# Patient Record
Sex: FEMALE | Race: WHITE | Employment: FULL TIME | URBAN - METROPOLITAN AREA
[De-identification: names, ages, dates, MRNs, and addresses within clinical notes are randomized per-mention and may not be internally consistent; named-entity substitution may affect disease eponyms.]

---

## 2017-03-12 ENCOUNTER — HOSPITAL ENCOUNTER (OUTPATIENT)
Facility: CLINIC | Age: 42
Setting detail: OBSERVATION
Discharge: HOME | End: 2017-03-13
Attending: EMERGENCY MEDICINE | Admitting: INTERNAL MEDICINE
Payer: COMMERCIAL

## 2017-03-12 ENCOUNTER — APPOINTMENT (OUTPATIENT)
Dept: CT IMAGING | Facility: CLINIC | Age: 42
End: 2017-03-12
Attending: EMERGENCY MEDICINE
Payer: COMMERCIAL

## 2017-03-12 DIAGNOSIS — N23 RENAL COLIC: ICD-10-CM

## 2017-03-12 DIAGNOSIS — E87.6 HYPOKALEMIA: ICD-10-CM

## 2017-03-12 LAB
ALBUMIN SERPL-MCNC: 3.1 G/DL (ref 3.4–5)
ALBUMIN UR-MCNC: 30 MG/DL
ALBUMIN UR-MCNC: NEGATIVE MG/DL
ALP SERPL-CCNC: 73 U/L (ref 40–150)
ALT SERPL W P-5'-P-CCNC: 19 U/L (ref 0–50)
ANION GAP SERPL CALCULATED.3IONS-SCNC: 9 MMOL/L (ref 3–14)
APPEARANCE UR: CLEAR
APPEARANCE UR: CLEAR
AST SERPL W P-5'-P-CCNC: 15 U/L (ref 0–45)
BACTERIA #/AREA URNS HPF: ABNORMAL /HPF
BACTERIA #/AREA URNS HPF: ABNORMAL /HPF
BASOPHILS # BLD AUTO: 0 10E9/L (ref 0–0.2)
BASOPHILS NFR BLD AUTO: 0.2 %
BILIRUB SERPL-MCNC: 1 MG/DL (ref 0.2–1.3)
BILIRUB UR QL STRIP: NEGATIVE
BILIRUB UR QL STRIP: NEGATIVE
BUN SERPL-MCNC: 10 MG/DL (ref 7–30)
CALCIUM SERPL-MCNC: 8.7 MG/DL (ref 8.5–10.1)
CAOX CRY #/AREA URNS HPF: ABNORMAL /HPF
CHLORIDE SERPL-SCNC: 107 MMOL/L (ref 94–109)
CO2 SERPL-SCNC: 26 MMOL/L (ref 20–32)
COLOR UR AUTO: ABNORMAL
COLOR UR AUTO: ABNORMAL
CREAT SERPL-MCNC: 0.85 MG/DL (ref 0.52–1.04)
DIFFERENTIAL METHOD BLD: ABNORMAL
EOSINOPHIL # BLD AUTO: 0.1 10E9/L (ref 0–0.7)
EOSINOPHIL NFR BLD AUTO: 0.3 %
ERYTHROCYTE [DISTWIDTH] IN BLOOD BY AUTOMATED COUNT: 13.2 % (ref 10–15)
GFR SERPL CREATININE-BSD FRML MDRD: 73 ML/MIN/1.7M2
GLUCOSE SERPL-MCNC: 96 MG/DL (ref 70–99)
GLUCOSE UR STRIP-MCNC: NEGATIVE MG/DL
GLUCOSE UR STRIP-MCNC: NEGATIVE MG/DL
HCG SERPL QL: NEGATIVE
HCT VFR BLD AUTO: 38.1 % (ref 35–47)
HGB BLD-MCNC: 13.6 G/DL (ref 11.7–15.7)
HGB UR QL STRIP: ABNORMAL
HGB UR QL STRIP: ABNORMAL
IMM GRANULOCYTES # BLD: 0.1 10E9/L (ref 0–0.4)
IMM GRANULOCYTES NFR BLD: 0.3 %
KETONES UR STRIP-MCNC: 5 MG/DL
KETONES UR STRIP-MCNC: 5 MG/DL
LACTATE BLD-SCNC: 1.1 MMOL/L (ref 0.7–2.1)
LEUKOCYTE ESTERASE UR QL STRIP: NEGATIVE
LEUKOCYTE ESTERASE UR QL STRIP: NEGATIVE
LIPASE SERPL-CCNC: 66 U/L (ref 73–393)
LYMPHOCYTES # BLD AUTO: 1.9 10E9/L (ref 0.8–5.3)
LYMPHOCYTES NFR BLD AUTO: 12.5 %
MCH RBC QN AUTO: 31.6 PG (ref 26.5–33)
MCHC RBC AUTO-ENTMCNC: 35.7 G/DL (ref 31.5–36.5)
MCV RBC AUTO: 88 FL (ref 78–100)
MONOCYTES # BLD AUTO: 1 10E9/L (ref 0–1.3)
MONOCYTES NFR BLD AUTO: 6.4 %
MUCOUS THREADS #/AREA URNS LPF: PRESENT /LPF
MUCOUS THREADS #/AREA URNS LPF: PRESENT /LPF
NEUTROPHILS # BLD AUTO: 12.5 10E9/L (ref 1.6–8.3)
NEUTROPHILS NFR BLD AUTO: 80.3 %
NITRATE UR QL: POSITIVE
NITRATE UR QL: POSITIVE
NRBC # BLD AUTO: 0 10*3/UL
NRBC BLD AUTO-RTO: 0 /100
PH UR STRIP: 6 PH (ref 5–7)
PH UR STRIP: 6 PH (ref 5–7)
PLATELET # BLD AUTO: 260 10E9/L (ref 150–450)
POTASSIUM SERPL-SCNC: 3.1 MMOL/L (ref 3.4–5.3)
PROT SERPL-MCNC: 6.4 G/DL (ref 6.8–8.8)
RBC # BLD AUTO: 4.31 10E12/L (ref 3.8–5.2)
RBC #/AREA URNS AUTO: 17 /HPF (ref 0–2)
RBC #/AREA URNS AUTO: 66 /HPF (ref 0–2)
SODIUM SERPL-SCNC: 142 MMOL/L (ref 133–144)
SP GR UR STRIP: 1.02 (ref 1–1.03)
SP GR UR STRIP: 1.03 (ref 1–1.03)
SQUAMOUS #/AREA URNS AUTO: 1 /HPF (ref 0–1)
SQUAMOUS #/AREA URNS AUTO: 5 /HPF (ref 0–1)
URN SPEC COLLECT METH UR: ABNORMAL
URN SPEC COLLECT METH UR: ABNORMAL
UROBILINOGEN UR STRIP-MCNC: 4 MG/DL (ref 0–2)
UROBILINOGEN UR STRIP-MCNC: 4 MG/DL (ref 0–2)
WBC # BLD AUTO: 15.5 10E9/L (ref 4–11)
WBC #/AREA URNS AUTO: 2 /HPF (ref 0–2)
WBC #/AREA URNS AUTO: 7 /HPF (ref 0–2)

## 2017-03-12 PROCEDURE — 74176 CT ABD & PELVIS W/O CONTRAST: CPT

## 2017-03-12 PROCEDURE — G0378 HOSPITAL OBSERVATION PER HR: HCPCS

## 2017-03-12 PROCEDURE — 84703 CHORIONIC GONADOTROPIN ASSAY: CPT | Performed by: EMERGENCY MEDICINE

## 2017-03-12 PROCEDURE — 25000132 ZZH RX MED GY IP 250 OP 250 PS 637: Performed by: EMERGENCY MEDICINE

## 2017-03-12 PROCEDURE — 99285 EMERGENCY DEPT VISIT HI MDM: CPT | Mod: 25

## 2017-03-12 PROCEDURE — 83605 ASSAY OF LACTIC ACID: CPT | Performed by: EMERGENCY MEDICINE

## 2017-03-12 PROCEDURE — 25000125 ZZHC RX 250: Performed by: INTERNAL MEDICINE

## 2017-03-12 PROCEDURE — 96365 THER/PROPH/DIAG IV INF INIT: CPT

## 2017-03-12 PROCEDURE — 96361 HYDRATE IV INFUSION ADD-ON: CPT

## 2017-03-12 PROCEDURE — 85025 COMPLETE CBC W/AUTO DIFF WBC: CPT | Performed by: EMERGENCY MEDICINE

## 2017-03-12 PROCEDURE — 25000128 H RX IP 250 OP 636

## 2017-03-12 PROCEDURE — 96375 TX/PRO/DX INJ NEW DRUG ADDON: CPT

## 2017-03-12 PROCEDURE — 25000128 H RX IP 250 OP 636: Performed by: EMERGENCY MEDICINE

## 2017-03-12 PROCEDURE — 99220 ZZC INITIAL OBSERVATION CARE,LEVL III: CPT | Performed by: INTERNAL MEDICINE

## 2017-03-12 PROCEDURE — 96376 TX/PRO/DX INJ SAME DRUG ADON: CPT

## 2017-03-12 PROCEDURE — 81001 URINALYSIS AUTO W/SCOPE: CPT | Performed by: EMERGENCY MEDICINE

## 2017-03-12 PROCEDURE — 80053 COMPREHEN METABOLIC PANEL: CPT | Performed by: EMERGENCY MEDICINE

## 2017-03-12 PROCEDURE — 83690 ASSAY OF LIPASE: CPT | Performed by: EMERGENCY MEDICINE

## 2017-03-12 PROCEDURE — 87086 URINE CULTURE/COLONY COUNT: CPT | Performed by: EMERGENCY MEDICINE

## 2017-03-12 PROCEDURE — 25000128 H RX IP 250 OP 636: Performed by: INTERNAL MEDICINE

## 2017-03-12 RX ORDER — ONDANSETRON 4 MG/1
4 TABLET, ORALLY DISINTEGRATING ORAL EVERY 8 HOURS PRN
Qty: 10 TABLET | Refills: 0 | Status: SHIPPED | OUTPATIENT
Start: 2017-03-12 | End: 2017-03-15

## 2017-03-12 RX ORDER — POTASSIUM CHLORIDE 29.8 MG/ML
20 INJECTION INTRAVENOUS
Status: DISCONTINUED | OUTPATIENT
Start: 2017-03-12 | End: 2017-03-12

## 2017-03-12 RX ORDER — NALOXONE HYDROCHLORIDE 0.4 MG/ML
.1-.4 INJECTION, SOLUTION INTRAMUSCULAR; INTRAVENOUS; SUBCUTANEOUS
Status: DISCONTINUED | OUTPATIENT
Start: 2017-03-12 | End: 2017-03-13 | Stop reason: HOSPADM

## 2017-03-12 RX ORDER — PROCHLORPERAZINE MALEATE 5 MG
5-10 TABLET ORAL EVERY 6 HOURS PRN
Status: DISCONTINUED | OUTPATIENT
Start: 2017-03-12 | End: 2017-03-13 | Stop reason: HOSPADM

## 2017-03-12 RX ORDER — TAMSULOSIN HYDROCHLORIDE 0.4 MG/1
0.4 CAPSULE ORAL DAILY
Status: DISCONTINUED | OUTPATIENT
Start: 2017-03-13 | End: 2017-03-13 | Stop reason: HOSPADM

## 2017-03-12 RX ORDER — ACETAMINOPHEN 325 MG/1
650 TABLET ORAL EVERY 4 HOURS PRN
Status: DISCONTINUED | OUTPATIENT
Start: 2017-03-12 | End: 2017-03-13 | Stop reason: HOSPADM

## 2017-03-12 RX ORDER — ONDANSETRON 2 MG/ML
4 INJECTION INTRAMUSCULAR; INTRAVENOUS ONCE
Status: COMPLETED | OUTPATIENT
Start: 2017-03-12 | End: 2017-03-12

## 2017-03-12 RX ORDER — KETOROLAC TROMETHAMINE 30 MG/ML
30 INJECTION, SOLUTION INTRAMUSCULAR; INTRAVENOUS ONCE
Status: COMPLETED | OUTPATIENT
Start: 2017-03-12 | End: 2017-03-12

## 2017-03-12 RX ORDER — AMOXICILLIN 250 MG
1-2 CAPSULE ORAL 2 TIMES DAILY PRN
Status: DISCONTINUED | OUTPATIENT
Start: 2017-03-12 | End: 2017-03-13 | Stop reason: HOSPADM

## 2017-03-12 RX ORDER — ONDANSETRON 4 MG/1
4 TABLET, ORALLY DISINTEGRATING ORAL EVERY 6 HOURS PRN
Status: DISCONTINUED | OUTPATIENT
Start: 2017-03-12 | End: 2017-03-13 | Stop reason: HOSPADM

## 2017-03-12 RX ORDER — POTASSIUM CHLORIDE 7.45 MG/ML
10 INJECTION INTRAVENOUS
Status: DISCONTINUED | OUTPATIENT
Start: 2017-03-12 | End: 2017-03-13

## 2017-03-12 RX ORDER — POTASSIUM CHLORIDE 1.5 G/1.58G
20-40 POWDER, FOR SOLUTION ORAL
Status: DISCONTINUED | OUTPATIENT
Start: 2017-03-12 | End: 2017-03-13

## 2017-03-12 RX ORDER — HYDROMORPHONE HYDROCHLORIDE 1 MG/ML
0.5 INJECTION, SOLUTION INTRAMUSCULAR; INTRAVENOUS; SUBCUTANEOUS ONCE
Status: COMPLETED | OUTPATIENT
Start: 2017-03-12 | End: 2017-03-12

## 2017-03-12 RX ORDER — SODIUM CHLORIDE AND POTASSIUM CHLORIDE 150; 900 MG/100ML; MG/100ML
INJECTION, SOLUTION INTRAVENOUS CONTINUOUS
Status: DISCONTINUED | OUTPATIENT
Start: 2017-03-12 | End: 2017-03-13 | Stop reason: HOSPADM

## 2017-03-12 RX ORDER — SODIUM CHLORIDE 9 MG/ML
1000 INJECTION, SOLUTION INTRAVENOUS CONTINUOUS
Status: DISCONTINUED | OUTPATIENT
Start: 2017-03-12 | End: 2017-03-12

## 2017-03-12 RX ORDER — POTASSIUM CHLORIDE 1500 MG/1
40 TABLET, EXTENDED RELEASE ORAL ONCE
Status: COMPLETED | OUTPATIENT
Start: 2017-03-12 | End: 2017-03-12

## 2017-03-12 RX ORDER — OXYCODONE AND ACETAMINOPHEN 5; 325 MG/1; MG/1
1-2 TABLET ORAL EVERY 4 HOURS PRN
Qty: 15 TABLET | Refills: 0 | Status: SHIPPED | OUTPATIENT
Start: 2017-03-12

## 2017-03-12 RX ORDER — POTASSIUM CHLORIDE 1500 MG/1
20-40 TABLET, EXTENDED RELEASE ORAL
Status: DISCONTINUED | OUTPATIENT
Start: 2017-03-12 | End: 2017-03-13

## 2017-03-12 RX ORDER — HYDROMORPHONE HYDROCHLORIDE 1 MG/ML
0.5 INJECTION, SOLUTION INTRAMUSCULAR; INTRAVENOUS; SUBCUTANEOUS
Status: DISCONTINUED | OUTPATIENT
Start: 2017-03-12 | End: 2017-03-12

## 2017-03-12 RX ORDER — TAMSULOSIN HYDROCHLORIDE 0.4 MG/1
0.4 CAPSULE ORAL DAILY
Qty: 7 CAPSULE | Refills: 1 | Status: SHIPPED | OUTPATIENT
Start: 2017-03-12

## 2017-03-12 RX ORDER — ONDANSETRON 2 MG/ML
4 INJECTION INTRAMUSCULAR; INTRAVENOUS EVERY 6 HOURS PRN
Status: DISCONTINUED | OUTPATIENT
Start: 2017-03-12 | End: 2017-03-13 | Stop reason: HOSPADM

## 2017-03-12 RX ORDER — CEFTRIAXONE 1 G/1
1 INJECTION, POWDER, FOR SOLUTION INTRAMUSCULAR; INTRAVENOUS ONCE
Status: COMPLETED | OUTPATIENT
Start: 2017-03-12 | End: 2017-03-12

## 2017-03-12 RX ORDER — MAGNESIUM SULFATE HEPTAHYDRATE 40 MG/ML
4 INJECTION, SOLUTION INTRAVENOUS EVERY 4 HOURS PRN
Status: DISCONTINUED | OUTPATIENT
Start: 2017-03-12 | End: 2017-03-13

## 2017-03-12 RX ORDER — HYDROMORPHONE HYDROCHLORIDE 1 MG/ML
.3-.5 INJECTION, SOLUTION INTRAMUSCULAR; INTRAVENOUS; SUBCUTANEOUS
Status: DISCONTINUED | OUTPATIENT
Start: 2017-03-12 | End: 2017-03-13 | Stop reason: HOSPADM

## 2017-03-12 RX ORDER — PROCHLORPERAZINE 25 MG
25 SUPPOSITORY, RECTAL RECTAL EVERY 12 HOURS PRN
Status: DISCONTINUED | OUTPATIENT
Start: 2017-03-12 | End: 2017-03-13 | Stop reason: HOSPADM

## 2017-03-12 RX ORDER — OXYCODONE HYDROCHLORIDE 5 MG/1
5-10 TABLET ORAL
Status: DISCONTINUED | OUTPATIENT
Start: 2017-03-12 | End: 2017-03-13 | Stop reason: HOSPADM

## 2017-03-12 RX ADMIN — SODIUM CHLORIDE 1000 ML: 9 INJECTION, SOLUTION INTRAVENOUS at 17:18

## 2017-03-12 RX ADMIN — SODIUM CHLORIDE 1000 ML: 9 INJECTION, SOLUTION INTRAVENOUS at 14:49

## 2017-03-12 RX ADMIN — HYDROMORPHONE HYDROCHLORIDE 1 MG: 1 INJECTION, SOLUTION INTRAMUSCULAR; INTRAVENOUS; SUBCUTANEOUS at 16:01

## 2017-03-12 RX ADMIN — POTASSIUM CHLORIDE 10 MEQ: 14.9 INJECTION, SOLUTION, CONCENTRATE PARENTERAL at 22:38

## 2017-03-12 RX ADMIN — ONDANSETRON 4 MG: 2 INJECTION INTRAMUSCULAR; INTRAVENOUS at 14:49

## 2017-03-12 RX ADMIN — HYDROMORPHONE HYDROCHLORIDE 0.5 MG: 10 INJECTION, SOLUTION INTRAMUSCULAR; INTRAVENOUS; SUBCUTANEOUS at 20:27

## 2017-03-12 RX ADMIN — HYDROMORPHONE HYDROCHLORIDE 0.5 MG: 10 INJECTION, SOLUTION INTRAMUSCULAR; INTRAVENOUS; SUBCUTANEOUS at 18:11

## 2017-03-12 RX ADMIN — HYDROMORPHONE HYDROCHLORIDE 1 MG: 1 INJECTION, SOLUTION INTRAMUSCULAR; INTRAVENOUS; SUBCUTANEOUS at 19:37

## 2017-03-12 RX ADMIN — KETOROLAC TROMETHAMINE 30 MG: 30 INJECTION, SOLUTION INTRAMUSCULAR at 15:33

## 2017-03-12 RX ADMIN — POTASSIUM CHLORIDE AND SODIUM CHLORIDE: 900; 150 INJECTION, SOLUTION INTRAVENOUS at 21:19

## 2017-03-12 RX ADMIN — POTASSIUM CHLORIDE 10 MEQ: 14.9 INJECTION, SOLUTION, CONCENTRATE PARENTERAL at 21:19

## 2017-03-12 RX ADMIN — HYDROMORPHONE HYDROCHLORIDE 0.5 MG: 10 INJECTION, SOLUTION INTRAMUSCULAR; INTRAVENOUS; SUBCUTANEOUS at 22:38

## 2017-03-12 RX ADMIN — ONDANSETRON 4 MG: 2 INJECTION INTRAMUSCULAR; INTRAVENOUS at 20:27

## 2017-03-12 RX ADMIN — HYDROMORPHONE HYDROCHLORIDE 1 MG: 1 INJECTION, SOLUTION INTRAMUSCULAR; INTRAVENOUS; SUBCUTANEOUS at 19:39

## 2017-03-12 RX ADMIN — CEFTRIAXONE 1 G: 1 INJECTION, POWDER, FOR SOLUTION INTRAMUSCULAR; INTRAVENOUS at 17:18

## 2017-03-12 RX ADMIN — POTASSIUM CHLORIDE 40 MEQ: 1500 TABLET, EXTENDED RELEASE ORAL at 18:11

## 2017-03-12 RX ADMIN — PROCHLORPERAZINE EDISYLATE 10 MG: 5 INJECTION INTRAMUSCULAR; INTRAVENOUS at 20:51

## 2017-03-12 ASSESSMENT — ENCOUNTER SYMPTOMS
VOMITING: 0
BACK PAIN: 0
DIARRHEA: 0
FEVER: 0
HEMATURIA: 1
CONSTIPATION: 0
FREQUENCY: 1
BLOOD IN STOOL: 0
ABDOMINAL PAIN: 1
NAUSEA: 1
FLANK PAIN: 1
DYSURIA: 0

## 2017-03-12 ASSESSMENT — PAIN DESCRIPTION - DESCRIPTORS
DESCRIPTORS: SHARP

## 2017-03-12 NOTE — ED PROVIDER NOTES
"  History     Chief Complaint:  Abdominal Pain    HPI   Eugenie Toro is a 41 year old female who presents to the emergency department today for evaluation of abdominal pain. The patient reports that she has a history of kidney stones and kidney infections dating back to November of 2016 and underwent lithotripsy in January. She states that on Tuesday 3/7/2017 she noticed that her urine was pink in color and noticed she started having left sided abdominal/flank pain consistent with past kidney stones. She notes that after returning via car today she was having \"unbearable pain,\" especially with bumps during the car ride, prompting her visit. She also notes some nausea and urgency/frequency as well, but no dysuria. Furthermore, she denies any fevers, emesis, changes in bowel habits, trauma to her back or other concerns at this time. She follows with Dr. Nelson of urology through [facility]. Moreover, the patient notes that Tylenol and Ibuprofen dre doing nothing to take the edge off her pain.     Allergies:  Ciprofloxacin - Hives  Morphine Sulfate - Hives  Sulfa Drugs - Hives    Medications:    Imitrex    Past Medical History:    Anxiety  Chronic kidney disease  Chronic pain  Irritable bowel  Migraines PTSD    Past Surgical History:    Hysterectomy  Tonsillectomy & Adenoidectomy  Cl aff surgical pathology  Appendectomy  Cholecystectomy  Laparoscopy  Removal of kidney stone  Discectomy lumbar posterior microscopic one level (2013)  Orthopedic surgery    Family History:    History reviewed. No pertinent family history.     Social History:  The patient was accompanied to the ED by her  and father.  Smoking Status: Current Every Day Smoker  Alcohol Use: Negative  Marital Status:   [2]    Review of Systems   Constitutional: Negative for fever.   Gastrointestinal: Positive for abdominal pain (Left side radiating towards flank) and nausea. Negative for blood in stool, constipation, diarrhea and vomiting. " "  Genitourinary: Positive for flank pain (Left), frequency, hematuria and urgency. Negative for dysuria.   Musculoskeletal: Negative for back pain.   All other systems reviewed and are negative.    Physical Exam   Vitals:  Patient Vitals for the past 24 hrs:   BP Temp Temp src Pulse Resp SpO2 Height Weight   03/12/17 2009 (!) 173/101 98.2  F (36.8  C) Oral 87 20 94 % 1.651 m (5' 5\") 127 kg (280 lb)   03/12/17 1945 (!) 155/98 - - - - 96 % - -   03/12/17 1936 (!) 168/102 - - 82 20 98 % - -   03/12/17 1900 130/76 - - - - 95 % - -   03/12/17 1845 (!) 158/96 - - - - 96 % - -   03/12/17 1830 130/76 - - - - 97 % - -   03/12/17 1815 (!) 129/101 - - - - 95 % - -   03/12/17 1800 150/88 - - - - 93 % - -   03/12/17 1745 143/83 - - - - 92 % - -   03/12/17 1730 138/90 - - - - 93 % - -   03/12/17 1723 134/87 - - - - 94 % - -   03/12/17 1630 142/86 - - - - 97 % - -   03/12/17 1615 (!) 144/92 - - - - 96 % - -   03/12/17 1600 133/84 - - - - 97 % - -   03/12/17 1545 137/87 - - - - 99 % - -   03/12/17 1530 (!) 155/99 - - - - 99 % - -   03/12/17 1445 (!) 158/94 - - - - 97 % - -   03/12/17 1437 (!) 155/97 - - - - 98 % - -   03/12/17 1407 (!) 176/100 - - - - - - -   03/12/17 1406 - 97.9  F (36.6  C) Oral 88 18 95 % 1.651 m (5' 5\") 79.4 kg (175 lb)     Physical Exam  General: Patient laying on gurney, appears somewhat uncomfortable.   HEENT:   The scalp and head appear normal    The pupils are equal, round, and reactive to light    Extraocular muscles are intact.    The nose is normal.    The oropharynx is normal.      Uvula is in the midline.  There is no peritonsillar abscess.  Neck:  Normal range of motion.    Lungs:  Clear.      No rales, no wheezing.      There is no tachypnea.  Non-labored.  Cardiac: Regular rate.      Normal S1 and S2.      No S3 or S4.      No pathological murmur.      No pericardial rub.  Abdomen: Soft. No distension. No tympani. No rebound. Left lower quadrant tenderness.  Lymph: No anterior or posterior cervical " lymphadenopathy noted.  MS:  Diffuse paralumbar tenderness along left flank.    Normal tone.      Normal movement of all extremities.    Neuro:  Normal mentation.  No focal motor or sensory changes.      Speech normal.  Psych:  Awake.     Alert.      Normal affect.      Appropriate interactions.  Skin:  No rash.      No lesions.    Emergency Department Course     Imaging:  Radiology findings were communicated with the patient who voiced understanding of the findings.    Abdomen/Pelvis CT w/o contrast:  1. Distal left ureter stone is 0.4 cm. This is in the region of the  ureterovesical junction. Mild to moderate left hydronephrosis.  2. No other acute abnormality.  Reading per radiology    Laboratory:  Laboratory findings were communicated with the patient who voiced understanding of the findings.    UA: Ketones: 5 (A), Blood: Moderate (A), urobilinogen: 4.0 (H), Nitrite: Positive (A), WBC/HPF: 7 (H), bacteria: Few (A), Mucous Present (A), RBC: 66 (H), Squamous cells: 5 (H), Calcium Oxalate: Few (A)    UA cathed specimen: Ketones: 5 (A), Blood: Moderate (A), urobilinogen: 4.0 (H), Nitrite: Positive (A), RBC/HPF: 17 (H), bacteria: Few (A), Mucous Present (A)    CBC: WBC: 15.5 (H) o/w WNL. (HGB 13.6, )   CMP: K: 3.1 (L), Albumin: 3.1 (L), Protein total: 6.4 (L) (Creatinine 0.85)  Lactic acid: 1.1  Lipase: 66 (L)  HCG Qualitative Urine: Negative    Urine Culture Aerobic Bacteria: Pending    Interventions:  1449 ns 1000 mL IV  1449 Zofran 4 mg IV  1533 Toradol 30 mg IV  1601 Dilaudid 1 mg IV  1718 ns 1000 mL IV  1718 Rocephin 1 g IV  1811 Dilaudid 0.5 mg IV  1811 Potassium Chloride 40 mEq PO     Emergency Department Course:  Nursing notes and vitals reviewed.  I performed an exam of the patient as documented above.   The patient provided a urine sample here in the emergency department. This was sent for laboratory testing, findings above.  The patient was sent for an Abdomen/Pelvis CT w/o contrast while in the  emergency department, results above.   IV was inserted and blood was drawn for laboratory testing, results above.  At 1716 the patient was rechecked and was updated on the results of her laboratory and imaging studies.   1830: I spoke with Dr. Schrader of Urology regarding patient's presentation, findings, and plan of care.  I discussed the treatment plan with the patient. They expressed understanding of this plan and consented to placement in the observation unit. I discussed the patient with Dr. Arnett, who will admit the patient to a monitored bed for further evaluation and treatment.  I personally reviewed the laboratory and imaging results with the patient and answered all related questions prior to placement in the observation unit.    Impression & Plan      Medical Decision Making:  Eugenie Toro is a 41 year old female who presents to the emergency department today for evaluation of flank pain. The has a 4 mm calculus in her left UVJ with moderate hydronephrosis, but also has urine suggesting a possible infection with nitrites, a few bacteria, and 2 white cells. She received a gram of Rocephin here and I spoke with the urologist on-call for her [facility] urologist. He felt that the patient should also receive antibiotics and then retrieval of the stone tomorrow or if she spikes a fever a retrieval and a stent. The patient did not want to be transferred in the [facility] to [facility] to where her urologist goes. She has also seen Dr. Dick a urologist here in [place]. She agreed for observation here ultimately and I discussed the case with Dr. Schrader. He felt it was reasonable to continue the antibiotics and again if she spikes a fever they would retrieve the stone and place a stent. She will remain NPO after midnight. She has continued to have pain, but did improve with Dilaudid. She has had no more vomiting here. She does have an elevated white count which is also concerning for possible infection,  but she does not have a fever here and has not felt febrile at home, although did not check for one.     Diagnosis:    ICD-10-CM    1. Renal colic N23    2. Hypokalemia E87.6    3. Elevated nitrite in urine suggestive of infection.       Plan: Admit to the observation unit. Antibiotics, IV fluids, pain control, antiemetics if needed, and further cares as discussed.       Scribe Disclosure:  I, Mike Miles, am serving as a scribe at 2:16 PM on 3/12/2017 to document services personally performed by Heriberto Fan MD, based on my observations and the provider's statements to me.    3/12/2017   [facility] EMERGENCY DEPARTMENT         Heriberto Fan MD  03/12/17 2807

## 2017-03-12 NOTE — IP AVS SNAPSHOT
[facility] PreOP/PostOP    [address]    [facility] [address]    Phone:  [phone]    Fax:  [phone]                                       After Visit Summary   3/12/2017    Eugenie Toro    MRN: [id number]           After Visit Summary Signature Page     I have received my discharge instructions, and my questions have been answered. I have discussed any challenges I see with this plan with the nurse or doctor.    ..........................................................................................................................................  Patient/Patient Representative Signature      ..........................................................................................................................................  Patient Representative Print Name and Relationship to Patient    ..................................................               ................................................  Date                                            Time    ..........................................................................................................................................  Reviewed by Signature/Title    ...................................................              ..............................................  Date                                                            Time

## 2017-03-12 NOTE — IP AVS SNAPSHOT
MRN:[id number]                      After Visit Summary   3/12/2017    Eugenie Toro    MRN: [id number]           Thank you!     Thank you for choosing [facility] for your care. Our goal is always to provide you with excellent care. Hearing back from our patients is one way we can continue to improve our services. Please take a few minutes to complete the written survey that you may receive in the mail after you visit. If you would like to speak to someone directly about your visit please contact Patient Relations at [phone]. Thank you!          Patient Information     Date Of Birth          1975        About your hospital stay     You were admitted on:  March 12, 2017 You last received care in the:  [facility] PreOP/PostOP    You were discharged on:  March 13, 2017        Reason for your hospital stay       You were admitted due to concerns for left flank pain due to a kidney stone found on a CT scan. You were treated supportively overnight with IV fluids, antinausea medications. Flomax, and pain control. Urology was consulted and recommended having a procedure performed to assist with you passing the stone. You tolerated the procedure well and are safe to discharge from recovery.                  Who to Call     For medical emergencies, please call [phone].  For non-urgent questions about your medical care, please call your primary care provider or clinic, [phone]  For questions related to your surgery, please call your surgery clinic        Attending Provider     Provider Specialty    Heriberto Fan MD Emergency Medicine    Lucian Arnett MD Internal Medicine       Primary Care Provider Office Phone # Fax #    Nalini [facility] [phone]       [address] [place] [address]        After Care Instructions     Activity       Your activity upon discharge: activity as tolerated and per Urology's  recommendations            Diet       Follow this diet upon discharge: Advance diet as tolerated            Diet Instructions       Resume pre procedure diet            Discharge Instructions       Patient to arrange for follow up appointment in 1 week for stent removal            Encourage fluids       Encourage fluids at home to keep urine clear to light pink                  Follow-up Appointments     Follow-up and recommended labs and tests        Follow up with primary care provider, Nalini Madrid, within 7 days for hospital follow- up.  No follow up labs or test are needed.  Follow up with Urology per their recommendations.                  Your next 10 appointments already scheduled     Mar 21, 2017  2:00 PM CDT   (Arrive by 1:45 PM)   Cystoscopy with Greg Luque MD, [facility] ([facility])    [address]  [address]  [facility] [id number] [phone]              Further instructions from your care team       STENT INFORMATION/DISCHARGE INSTRUCTIONS  [facility]  [facility]  [phone]    During surgery, a stent may be placed in the ureter.  The ureter is the tube that drains urine from the kidney to the bladder.  The stent is placed to dilate (open) the ureter so stone fragments can pass easily through the ureter or to decrease ureteral swelling after surgery or to relieve an obstruction.      The stent is made of silicone.  The upper end of the stent curls in the kidney while the lower end rests in the bladder.    While the stent is in place you may experience the following symptoms:  Blood and/or small blood clots in the urine  Bladder spasms (frequency and urgency of urination)  Discomfort or aching in the back or side where the stent is  Burning or discomfort at the end of urine stream    To decrease these symptoms you should:  Take antispasmodic medication as prescribed  (Detrol, Ditropan, etc.)  Drink plenty of fluids but avoid caffeine and citrus (include cranberry)  If you are having discomfort in back or side, decrease activity    Please call your physician or the physician on call if you experience:  Fever greater than 101 degrees  Severe pain not relieved by pain medication or rest    Please make an appointment for the removal of the stent according to your physician's instructions.      CYSTOSCOPY DISCHARGE INSTRUCTIONS  [facility]  [facility]  [phone]    YOU MAY GO BACK TO YOUR NORMAL DIET AND ACTIVITY, UNLESS YOUR DOCTOR TELLS YOU NOT TO.    FOR THE NEXT TWO DAYS, YOU MAY NOTICE:    SOME BLOOD IN YOUR URINE.  SOME BURNING WHEN YOU URINATE (USE THE TOILET).  AN URGE TO URINATE MORE OFTEN.  BLADDER SPASMS.    THESE ARE NORMAL AFTER THE PROCEDURE.  THEY SHOULD GO AWAY AFTER A DAY OR TWO.  TO RELIEVE THESE PROBLEMS:     DRINK 6 TO 8 LARGE GLASSES OF WATER EACH DAY (INCLUDES DRINKS AT MEALS).  THIS WILL HELP CLEAR THE URINE.    TAKE WARM BATHS TO RELIEVE PAIN AND BLADDER SPASMS.  DO NOT ADD ANYTHING TO THE BATH WATER.    YOUR DOCTOR MAY PRESCRIBE PAIN MEDICINE.  YOU MAY ALSO TAKE TYLENOL (ACETAMINOPHEN) FOR PAIN.    CALL YOUR SURGEON IF YOU HAVE:    A FEVER OVER 101 DEGREES.  CHECK YOUR TEMPERATURE UNDER YOUR TONGUE.    CHILLS.    FAILURE TO URINATE (NO URINE COMES OUT WHEN YOU TRY TO USE THE TOILET).  TRY SOAKING IN A BATHTUB FULL OF WARM WATER.  IF STILL NO URINE, CALL YOUR DOCTOR.    A LOT OF BLOOD IN THE URINE, OR BLOOD CLOTS LARGER THAN A NICKEL.      PAIN IN THE BACK OR BELLY AREA (ABDOMEN).    PAIN OR SPASMS THAT ARE NOT RELIEVED BY WARM TUB BATHS AND PAIN MEDICINE.      SEVERE PAIN, BURNING OR OTHER PROBLEMS WHILE PASSING URINE.    PAIN THAT GETS WORSE AFTER TWO DAYS.        GENERAL ANESTHESIA OR SEDATION ADULT DISCHARGE INSTRUCTIONS   SPECIAL PRECAUTIONS FOR 24 HOURS AFTER SURGERY    IT IS NOT UNUSUAL TO FEEL LIGHT-HEADED OR  FAINT, UP TO 24 HOURS AFTER SURGERY OR WHILE TAKING PAIN MEDICATION.  IF YOU HAVE THESE SYMPTOMS; SIT FOR A FEW MINUTES BEFORE STANDING AND HAVE SOMEONE ASSIST YOU WHEN YOU GET UP TO WALK OR USE THE BATHROOM.    YOU SHOULD REST AND RELAX FOR THE NEXT 24 HOURS AND YOU MUST MAKE ARRANGEMENTS TO HAVE SOMEONE STAY WITH YOU FOR AT LEAST 24 HOURS AFTER YOUR DISCHARGE.  AVOID HAZARDOUS AND STRENUOUS ACTIVITIES.  DO NOT MAKE IMPORTANT DECISIONS FOR 24 HOURS.    DO NOT DRIVE ANY VEHICLE OR OPERATE MECHANICAL EQUIPMENT FOR 24 HOURS FOLLOWING THE END OF YOUR SURGERY.  EVEN THOUGH YOU MAY FEEL NORMAL, YOUR REACTIONS MAY BE AFFECTED BY THE MEDICATION YOU HAVE RECEIVED.    DO NOT DRINK ALCOHOLIC BEVERAGES FOR 24 HOURS FOLLOWING YOUR SURGERY.    DRINK CLEAR LIQUIDS (APPLE JUICE, GINGER ALE, 7-UP, BROTH, ETC.).  PROGRESS TO YOUR REGULAR DIET AS YOU FEEL ABLE.    YOU MAY HAVE A DRY MOUTH, A SORE THROAT, MUSCLES ACHES OR TROUBLE SLEEPING.  THESE SHOULD GO AWAY AFTER 24 HOURS.    CALL YOUR DOCTOR FOR ANY OF THE FOLLOWING:  SIGNS OF INFECTION (FEVER, GROWING TENDERNESS AT THE SURGERY SITE, A LARGE AMOUNT OF DRAINAGE OR BLEEDING, SEVERE PAIN, FOUL-SMELLING DRAINAGE, REDNESS OR SWELLING.    IT HAS BEEN OVER 8 TO 10 HOURS SINCE SURGERY AND YOU ARE STILL NOT ABLE TO URINATE (PASS WATER).         Pending Results     Date and Time Order Name Status Description    3/13/2017 1847 Stone analysis In process     3/12/2017 1631 Urine Culture Aerobic Bacterial Preliminary             Statement of Approval     Ordered          03/13/17 1033  I have reviewed and agree with all the recommendations and orders detailed in this document.  EFFECTIVE NOW     Approved and electronically signed by:  Nelida Cleary PA-C             Admission Information     Date & Time Provider Department Dept. Phone    3/12/2017 Lucian Arnett MD [facility] PreOP/PostOP [phone]      Your Vitals Were     Blood Pressure Pulse Temperature Respirations  "Height Weight    129/100 75 97.6  F (36.4  C) (Temporal) 16 1.651 m (5' 5\") 127 kg (280 lb)    Pulse Oximetry BMI (Body Mass Index)                98% 46.59 kg/m2          Between Information     Between lets you send messages to your doctor, view your test results, renew your prescriptions, schedule appointments and more. To sign up, go to [email or web address] . Click on \"Log in\" on the left side of the screen, which will take you to the Welcome page. Then click on \"Sign up Now\" on the right side of the page.     You will be asked to enter the access code listed below, as well as some personal information. Please follow the directions to create your username and password.     Your access code is: [email or web address]  Expires: 6/10/2017  5:59 PM     Your access code will  in 90 days. If you need help or a new code, please call your [place] clinic or [phone].        Care EveryWhere ID     This is your Care EveryWhere ID. This could be used by other organizations to access your [place] medical records  [id number]           Review of your medicines      START taking        Dose / Directions    HYDROcodone-acetaminophen 5-325 MG per tablet   Commonly known as:  NORCO        Dose:  1-2 tablet   Take 1-2 tablets by mouth every 4 hours as needed for moderate to severe pain (Moderate to Severe Pain)   Quantity:  5 tablet   Refills:  0       ondansetron 4 MG ODT tab   Commonly known as:  ZOFRAN ODT        Dose:  4 mg   Take 1 tablet (4 mg) by mouth every 8 hours as needed for nausea   Quantity:  10 tablet   Refills:  0       oxyCODONE-acetaminophen 5-325 MG per tablet   Commonly known as:  PERCOCET        Dose:  1-2 tablet   Take 1-2 tablets by mouth every 4 hours as needed for moderate to severe pain   Quantity:  15 tablet   Refills:  0       * tamsulosin 0.4 MG capsule   Commonly known as:  FLOMAX        Dose:  0.4 mg   Take 1 capsule (0.4 mg) by mouth daily   Quantity:  7 capsule   Refills:  1       * " tamsulosin 0.4 MG capsule   Commonly known as:  FLOMAX        Dose:  0.4 mg   Take 1 capsule (0.4 mg) by mouth daily   Quantity:  8 capsule   Refills:  1       * Notice:  This list has 2 medication(s) that are the same as other medications prescribed for you. Read the directions carefully, and ask your doctor or other care provider to review them with you.      CONTINUE these medicines which have NOT CHANGED        Dose / Directions    SUMAtriptan 6 MG/0.5ML injection   Commonly known as:  IMITREX        Dose:  6 mg   Inject 6 mg Subcutaneous every 2 hours as needed (migraine.?) Max dose is 12mg/24hrs   Refills:  0            Where to get your medicines      These medications were sent to [place], [place] - [address] [facility]  [address] [facility] [address]     Phone:  [phone]     tamsulosin 0.4 MG capsule         Some of these will need a paper prescription and others can be bought over the counter. Ask your nurse if you have questions.     Bring a paper prescription for each of these medications     HYDROcodone-acetaminophen 5-325 MG per tablet    ondansetron 4 MG ODT tab    oxyCODONE-acetaminophen 5-325 MG per tablet    tamsulosin 0.4 MG capsule                Protect others around you: Learn how to safely use, store and throw away your medicines at www.disposemymeds.org.             Medication List: This is a list of all your medications and when to take them. Check marks below indicate your daily home schedule. Keep this list as a reference.      Medications           Morning Afternoon Evening Bedtime As Needed    HYDROcodone-acetaminophen 5-325 MG per tablet   Commonly known as:  NORCO   Take 1-2 tablets by mouth every 4 hours as needed for moderate to severe pain (Moderate to Severe Pain)                                ondansetron 4 MG ODT tab   Commonly known as:  ZOFRAN ODT   Take 1 tablet (4 mg) by mouth every 8 hours as needed for nausea                                 oxyCODONE-acetaminophen 5-325 MG per tablet   Commonly known as:  PERCOCET   Take 1-2 tablets by mouth every 4 hours as needed for moderate to severe pain                                SUMAtriptan 6 MG/0.5ML injection   Commonly known as:  IMITREX   Inject 6 mg Subcutaneous every 2 hours as needed (migraine.?) Max dose is 12mg/24hrs                                * tamsulosin 0.4 MG capsule   Commonly known as:  FLOMAX   Take 1 capsule (0.4 mg) by mouth daily   Last time this was given:  0.4 mg on 3/13/2017  8:26 AM                                * tamsulosin 0.4 MG capsule   Commonly known as:  FLOMAX   Take 1 capsule (0.4 mg) by mouth daily   Last time this was given:  0.4 mg on 3/13/2017  8:26 AM                                * Notice:  This list has 2 medication(s) that are the same as other medications prescribed for you. Read the directions carefully, and ask your doctor or other care provider to review them with you.

## 2017-03-13 ENCOUNTER — APPOINTMENT (OUTPATIENT)
Dept: GENERAL RADIOLOGY | Facility: CLINIC | Age: 42
End: 2017-03-13
Attending: UROLOGY
Payer: COMMERCIAL

## 2017-03-13 ENCOUNTER — ANESTHESIA (OUTPATIENT)
Dept: SURGERY | Facility: CLINIC | Age: 42
End: 2017-03-13
Payer: COMMERCIAL

## 2017-03-13 ENCOUNTER — ANESTHESIA EVENT (OUTPATIENT)
Dept: SURGERY | Facility: CLINIC | Age: 42
End: 2017-03-13
Payer: COMMERCIAL

## 2017-03-13 VITALS
SYSTOLIC BLOOD PRESSURE: 160 MMHG | HEIGHT: 65 IN | HEART RATE: 75 BPM | DIASTOLIC BLOOD PRESSURE: 103 MMHG | RESPIRATION RATE: 16 BRPM | BODY MASS INDEX: 46.65 KG/M2 | TEMPERATURE: 97.5 F | OXYGEN SATURATION: 98 % | WEIGHT: 280 LBS

## 2017-03-13 LAB
BACTERIA SPEC CULT: NO GROWTH
Lab: NORMAL
MAGNESIUM SERPL-MCNC: 1.9 MG/DL (ref 1.6–2.3)
MICRO REPORT STATUS: NORMAL
POTASSIUM SERPL-SCNC: 3.5 MMOL/L (ref 3.4–5.3)
POTASSIUM SERPL-SCNC: 3.6 MMOL/L (ref 3.4–5.3)
SPECIMEN SOURCE: NORMAL

## 2017-03-13 PROCEDURE — 71000027 ZZH RECOVERY PHASE 2 EACH 15 MINS: Performed by: UROLOGY

## 2017-03-13 PROCEDURE — 27210995 ZZH RX 272: Performed by: UROLOGY

## 2017-03-13 PROCEDURE — 83735 ASSAY OF MAGNESIUM: CPT | Performed by: INTERNAL MEDICINE

## 2017-03-13 PROCEDURE — 25000125 ZZHC RX 250: Performed by: NURSE ANESTHETIST, CERTIFIED REGISTERED

## 2017-03-13 PROCEDURE — 40000306 ZZH STATISTIC PRE PROC ASSESS II: Performed by: UROLOGY

## 2017-03-13 PROCEDURE — 25000132 ZZH RX MED GY IP 250 OP 250 PS 637: Performed by: UROLOGY

## 2017-03-13 PROCEDURE — 25000128 H RX IP 250 OP 636: Performed by: NURSE ANESTHETIST, CERTIFIED REGISTERED

## 2017-03-13 PROCEDURE — 27210794 ZZH OR GENERAL SUPPLY STERILE: Performed by: UROLOGY

## 2017-03-13 PROCEDURE — G0378 HOSPITAL OBSERVATION PER HR: HCPCS

## 2017-03-13 PROCEDURE — C1769 GUIDE WIRE: HCPCS | Performed by: UROLOGY

## 2017-03-13 PROCEDURE — 37000008 ZZH ANESTHESIA TECHNICAL FEE, 1ST 30 MIN: Performed by: UROLOGY

## 2017-03-13 PROCEDURE — 36000058 ZZH SURGERY LEVEL 3 EA 15 ADDTL MIN: Performed by: UROLOGY

## 2017-03-13 PROCEDURE — 25000128 H RX IP 250 OP 636: Performed by: UROLOGY

## 2017-03-13 PROCEDURE — 74420 UROGRAPHY RTRGR +-KUB: CPT | Mod: 26 | Performed by: UROLOGY

## 2017-03-13 PROCEDURE — 37000009 ZZH ANESTHESIA TECHNICAL FEE, EACH ADDTL 15 MIN: Performed by: UROLOGY

## 2017-03-13 PROCEDURE — C2617 STENT, NON-COR, TEM W/O DEL: HCPCS | Performed by: UROLOGY

## 2017-03-13 PROCEDURE — 36415 COLL VENOUS BLD VENIPUNCTURE: CPT | Performed by: INTERNAL MEDICINE

## 2017-03-13 PROCEDURE — 99221 1ST HOSP IP/OBS SF/LOW 40: CPT | Mod: 25 | Performed by: UROLOGY

## 2017-03-13 PROCEDURE — 25000132 ZZH RX MED GY IP 250 OP 250 PS 637: Performed by: PHYSICIAN ASSISTANT

## 2017-03-13 PROCEDURE — 25500064 ZZH RX 255 OP 636: Performed by: UROLOGY

## 2017-03-13 PROCEDURE — 71000012 ZZH RECOVERY PHASE 1 LEVEL 1 FIRST HR: Performed by: UROLOGY

## 2017-03-13 PROCEDURE — 88300 SURGICAL PATH GROSS: CPT | Mod: 26 | Performed by: UROLOGY

## 2017-03-13 PROCEDURE — 25000128 H RX IP 250 OP 636: Performed by: INTERNAL MEDICINE

## 2017-03-13 PROCEDURE — 52356 CYSTO/URETERO W/LITHOTRIPSY: CPT | Performed by: UROLOGY

## 2017-03-13 PROCEDURE — 36000060 ZZH SURGERY LEVEL 3 W FLUORO 1ST 30 MIN: Performed by: UROLOGY

## 2017-03-13 PROCEDURE — 25800025 ZZH RX 258: Performed by: UROLOGY

## 2017-03-13 PROCEDURE — 25800025 ZZH RX 258: Performed by: ANESTHESIOLOGY

## 2017-03-13 PROCEDURE — 25000132 ZZH RX MED GY IP 250 OP 250 PS 637: Performed by: INTERNAL MEDICINE

## 2017-03-13 PROCEDURE — 84132 ASSAY OF SERUM POTASSIUM: CPT | Performed by: INTERNAL MEDICINE

## 2017-03-13 PROCEDURE — 88300 SURGICAL PATH GROSS: CPT | Performed by: UROLOGY

## 2017-03-13 PROCEDURE — 82365 CALCULUS SPECTROSCOPY: CPT | Performed by: UROLOGY

## 2017-03-13 PROCEDURE — 40000277 XR SURGERY CARM FLUORO LESS THAN 5 MIN W STILLS: Mod: TC

## 2017-03-13 PROCEDURE — 99217 ZZC OBSERVATION CARE DISCHARGE: CPT | Performed by: PHYSICIAN ASSISTANT

## 2017-03-13 PROCEDURE — 96376 TX/PRO/DX INJ SAME DRUG ADON: CPT

## 2017-03-13 PROCEDURE — 25000566 ZZH SEVOFLURANE, EA 15 MIN: Performed by: UROLOGY

## 2017-03-13 DEVICE — STENT URETERAL DBL PIGTAIL INLAY 4.7FRX26CM 778426: Type: IMPLANTABLE DEVICE | Site: URETER | Status: FUNCTIONAL

## 2017-03-13 RX ORDER — ONDANSETRON 2 MG/ML
INJECTION INTRAMUSCULAR; INTRAVENOUS PRN
Status: DISCONTINUED | OUTPATIENT
Start: 2017-03-13 | End: 2017-03-13

## 2017-03-13 RX ORDER — SODIUM CHLORIDE, SODIUM LACTATE, POTASSIUM CHLORIDE, CALCIUM CHLORIDE 600; 310; 30; 20 MG/100ML; MG/100ML; MG/100ML; MG/100ML
INJECTION, SOLUTION INTRAVENOUS CONTINUOUS
Status: DISCONTINUED | OUTPATIENT
Start: 2017-03-13 | End: 2017-03-13 | Stop reason: HOSPADM

## 2017-03-13 RX ORDER — CEFAZOLIN SODIUM 1 G/3ML
1 INJECTION, POWDER, FOR SOLUTION INTRAMUSCULAR; INTRAVENOUS SEE ADMIN INSTRUCTIONS
Status: DISCONTINUED | OUTPATIENT
Start: 2017-03-13 | End: 2017-03-13 | Stop reason: HOSPADM

## 2017-03-13 RX ORDER — FENTANYL CITRATE 50 UG/ML
INJECTION, SOLUTION INTRAMUSCULAR; INTRAVENOUS PRN
Status: DISCONTINUED | OUTPATIENT
Start: 2017-03-13 | End: 2017-03-13

## 2017-03-13 RX ORDER — TAMSULOSIN HYDROCHLORIDE 0.4 MG/1
0.4 CAPSULE ORAL DAILY
Qty: 8 CAPSULE | Refills: 1 | Status: SHIPPED | OUTPATIENT
Start: 2017-03-13

## 2017-03-13 RX ORDER — PROPOFOL 10 MG/ML
INJECTION, EMULSION INTRAVENOUS PRN
Status: DISCONTINUED | OUTPATIENT
Start: 2017-03-13 | End: 2017-03-13

## 2017-03-13 RX ORDER — GLYCOPYRROLATE 0.2 MG/ML
INJECTION, SOLUTION INTRAMUSCULAR; INTRAVENOUS PRN
Status: DISCONTINUED | OUTPATIENT
Start: 2017-03-13 | End: 2017-03-13

## 2017-03-13 RX ORDER — HYDROCODONE BITARTRATE AND ACETAMINOPHEN 5; 325 MG/1; MG/1
1-2 TABLET ORAL EVERY 4 HOURS PRN
Qty: 5 TABLET | Refills: 0 | Status: SHIPPED | OUTPATIENT
Start: 2017-03-13

## 2017-03-13 RX ORDER — LIDOCAINE HYDROCHLORIDE 10 MG/ML
INJECTION, SOLUTION INFILTRATION; PERINEURAL PRN
Status: DISCONTINUED | OUTPATIENT
Start: 2017-03-13 | End: 2017-03-13

## 2017-03-13 RX ORDER — CEFAZOLIN SODIUM 1 G/50ML
3 SOLUTION INTRAVENOUS
Status: COMPLETED | OUTPATIENT
Start: 2017-03-13 | End: 2017-03-13

## 2017-03-13 RX ADMIN — HYDROMORPHONE HYDROCHLORIDE 0.5 MG: 10 INJECTION, SOLUTION INTRAMUSCULAR; INTRAVENOUS; SUBCUTANEOUS at 11:58

## 2017-03-13 RX ADMIN — SALINE NASAL SPRAY 2 SPRAY: 1.5 SOLUTION NASAL at 11:58

## 2017-03-13 RX ADMIN — FENTANYL CITRATE 150 MCG: 50 INJECTION, SOLUTION INTRAMUSCULAR; INTRAVENOUS at 18:27

## 2017-03-13 RX ADMIN — LIDOCAINE HYDROCHLORIDE 50 MG: 10 INJECTION, SOLUTION INFILTRATION; PERINEURAL at 18:27

## 2017-03-13 RX ADMIN — ONDANSETRON 4 MG: 2 INJECTION INTRAMUSCULAR; INTRAVENOUS at 18:51

## 2017-03-13 RX ADMIN — SODIUM CHLORIDE, POTASSIUM CHLORIDE, SODIUM LACTATE AND CALCIUM CHLORIDE: 600; 310; 30; 20 INJECTION, SOLUTION INTRAVENOUS at 18:21

## 2017-03-13 RX ADMIN — ACETAMINOPHEN 650 MG: 325 TABLET, FILM COATED ORAL at 05:58

## 2017-03-13 RX ADMIN — OXYCODONE HYDROCHLORIDE 10 MG: 5 TABLET ORAL at 06:13

## 2017-03-13 RX ADMIN — Medication 3 G: at 18:21

## 2017-03-13 RX ADMIN — HYDROMORPHONE HYDROCHLORIDE 0.5 MG: 10 INJECTION, SOLUTION INTRAMUSCULAR; INTRAVENOUS; SUBCUTANEOUS at 17:05

## 2017-03-13 RX ADMIN — MIDAZOLAM HYDROCHLORIDE 2 MG: 1 INJECTION, SOLUTION INTRAMUSCULAR; INTRAVENOUS at 18:21

## 2017-03-13 RX ADMIN — HYDROMORPHONE HYDROCHLORIDE 0.5 MG: 10 INJECTION, SOLUTION INTRAMUSCULAR; INTRAVENOUS; SUBCUTANEOUS at 03:18

## 2017-03-13 RX ADMIN — POTASSIUM CHLORIDE 20 MEQ: 1500 TABLET, EXTENDED RELEASE ORAL at 03:10

## 2017-03-13 RX ADMIN — TAMSULOSIN HYDROCHLORIDE 0.4 MG: 0.4 CAPSULE ORAL at 08:26

## 2017-03-13 RX ADMIN — HYDROMORPHONE HYDROCHLORIDE 0.5 MG: 10 INJECTION, SOLUTION INTRAMUSCULAR; INTRAVENOUS; SUBCUTANEOUS at 08:27

## 2017-03-13 RX ADMIN — OXYCODONE HYDROCHLORIDE 10 MG: 5 TABLET ORAL at 13:15

## 2017-03-13 RX ADMIN — PROPOFOL 200 MG: 10 INJECTION, EMULSION INTRAVENOUS at 18:27

## 2017-03-13 RX ADMIN — GLYCOPYRROLATE 0.2 MG: 0.2 INJECTION, SOLUTION INTRAMUSCULAR; INTRAVENOUS at 18:27

## 2017-03-13 ASSESSMENT — PAIN DESCRIPTION - DESCRIPTORS
DESCRIPTORS: ACHING;BURNING
DESCRIPTORS: ACHING
DESCRIPTORS: ACHING;BURNING

## 2017-03-13 NOTE — PLAN OF CARE
Problem: Discharge Planning  Goal: Discharge Planning (Adult, OB, Behavioral, Peds)  Outcome: Improving  PRIMARY DIAGNOSIS: ACUTE RENAL COLIC  OUTPATIENT/OBSERVATION GOALS TO BE MET BEFORE DISCHARGE:     1. Pain status: Improved but still requiring IV narcotics.   2. Tolerating adequate PO diet: No  3. Cleared by consultants (if involved): Urology consult pending  4. ADLs back to baseline?  Yes  5. Activity and level of assistance: Ambulating independently.  6. Barriers to discharge noted No     A&O x 4.  currently denies pain and nausea. NPO at 0000 for urology consult.  SBA for safety. Replacing potassium per protocol, recheck at 0130 am. IV fluids currently infusing. Will continue to monitor, assess, and offer supportive cares.

## 2017-03-13 NOTE — PROGRESS NOTES
ROOM #225    Living Situation (if not independent, order SW consult): Apt with   Facility name:    Activity level at baseline: Ind  Activity level on admit: Ind    Is patient a falls risk? No  Falls armband on? Not applicable,   Within Arm's Reach? No.Reason not within arm's reach is: n/a  Bed alarm turned on?   Not applicable,   Personal alarm in place and turned on?   Not applicable,     Patient registered to observation; given Patient Bill of Rights; given the opportunity to ask questions about observation status and their plan of care.  Patient has been oriented to the observation room, bathroom and call light is in place.    : [facility].  See demographics.

## 2017-03-13 NOTE — H&P
CHIEF COMPLAINT:  Abdominal and flank pain.      HISTORY OF PRESENT ILLNESS:  Eugenie Toro is a 41-year-old female with past medical history of urolithiasis, chronic kidney disease, chronic pain, irritable bowel syndrome, migraines who presented to the emergency room today for evaluation of abdominal pain.  The patient has previous history of kidney stone and also pyelonephritis last one in 11/2016 at which time she underwent lithotripsy in January in [facility] system.  On 3/7/2017 patient started to have change in her urine color to more of pinkish and started to have left-sided abdominal pain which was intermittent.  Today the pain was unbearable and she came to the emergency room for evaluation.  Associated with the pain today patient has nausea and episode of vomiting, she had increased urinary frequency, no dysuria, no fever, no chills, no bowel habit change.      The patient has followup with Dr. Zuniga from [facility] in the past and she is aware Dr. Zuniga is not available to see her here and she is willing to see another urologist.  In the emergency room she was evaluated, she had CT scan done which showed a 0.4 cm stone in the uteropelvic junction treated with pain medication and being admitted to the hospital under observation.      PAST MEDICAL HISTORY:   1.  Urolithiasis.   2.  Chronic kidney disease.   3.  Anxiety.   4.  Chronic pain.   5.  Irritable bowel.   6.  Migraine.   7.  Posttraumatic stress disorder.      PAST SURGICAL HISTORY:   1.  Hysterectomy.   2.  Appendectomy.   3.  Cholecystectomy.   4.  Tonsillectomy and adenoidectomy.   5.  Lithotripsy of kidney stone.   6.  Lumbar diskectomy.   7.  Orthopedic surgery.      FAMILY HISTORY:  Reviewed and noncontributory to her current condition.      SOCIAL HISTORY:  The patient lives in [facility], , she smokes, she drinks on and off alcohol, she does not use illicit drugs.        REVIEW OF SYSTEMS:  Ten points reviewed,  all are negative except those mentioned in history of present illness.      PHYSICAL EXAMINATION:   GENERAL:  The patient is awake, alert, oriented not in any form of distress.   VITAL SIGNS:  Blood pressure 130/76, pulse rate 88, temperature 97.9, oxygen saturation 95% on room air.   HEENT:  Pink nonicteric, extraocular muscle movement intact.   NECK:  Supple, no JVD, no thyromegaly.   CHEST:  Good air entry bilaterally, no wheezing, crackles or rales.   CARDIOVASCULAR:  S1, S2 were heard, no gallop or murmur.   ABDOMEN:  Obese, soft and nontender, nondistended, positive bowel sounds, no organomegaly.   EXTREMITIES:  No edema, cyanosis or clubbing.   NEURO:  No neurologic deficits, cranial nerves grossly intact, motor and sensation intact.   SKIN:  No rash or exanthems.   PSYCH:  Normal mood and affect, keeps eye contact, responds to questions appropriately.      LABS:  Sodium 141, potassium is 3.1, chloride 107, BUN 10, creatinine 0.8, albumin is 3.1, protein 6.4, ALT 19, AST 15, lactic acid is 1, lipase 66, glucose 96.  WBC 15.5, hemoglobin is 13.6, platelets 260.     Urinalysis positive for nitrites, WBC 2, RBC 17, urine culture done.     CT scan of the abdomen and pelvis showed distal left ureteral stone 0.4 cm in the region of uteropelvic junction, mild to moderate left hydronephrosis, no other acute abnormalities.      ASSESSMENT:  Eugenie Toro is a 41-year-old female who presented with left flank pain,  nausea and found to have symptomatic urolithiasis and being admitted to the hospital.  I discussed with ED physician, Dr. Fan regarding this admission and Dr. Fan also discussed with on-call urologist regarding his admission:   1.  Renal colic.   2.  Hypokalemia.   3.  Leukocytosis likely reactive.      PLAN:  The patient is being admitted under observation for pain control, will encourage oral intake, started on IV fluids.  We will replace potassium, urologist is consulted, we will filter urine  started on Flomax.  The patient will be kept n.p.o. after midnight for possible cystoscopy, ureteroscopy in the morning if the stone does not pass.  I discussed at length with the patient the plan of care, all her questions and concerns addressed, showed understanding.      CODE STATUS:  In the event of cardiorespiratory arrest, she is full code.         OLIVIA ROBERTSON MD             D: 2017 19:28   T: 2017 20:04   MT: RUDY#[id number]      Name:     ARI VIVEROS   MRN:      [id number]        Account:      [id number]   :      1975           Admitted:     [id number]      Document: [id number]

## 2017-03-13 NOTE — PLAN OF CARE
Problem: Discharge Planning  Goal: Discharge Planning (Adult, OB, Behavioral, Peds)  PRIMARY DIAGNOSIS: ACUTE RENAL COLIC  OUTPATIENT/OBSERVATION GOALS TO BE MET BEFORE DISCHARGE:      1. Pain status: Improved but still requiring IV narcotics.   2. Tolerating adequate PO diet: No  3. Cleared by consultants (if involved): surgery at 550pm  4. ADLs back to baseline? Yes  5. Activity and level of assistance: Ambulating independently.  6. Barriers to discharge noted No      A&O x 4. Pt still requiring IV and PO pain meds. NPO currently. Urology has seen patient, surgery scheduled for 550pm and she should DC after. SBA for safety. IV fluids currently infusing. Will continue to monitor, assess, and offer supportive cares.

## 2017-03-13 NOTE — CONSULTS
UROLOGY CONSULTATION      REASON FOR CONSULTATION:  Left ureteral stone.      HISTORY OF PRESENT ILLNESS:  Eugenie Toro is a 38-year-old woman with a prior history of stones who was driving back from [place] yesterday when she experienced left-sided flank pain that she knew to be a stone. She could not make it all the way home and she stopped here in [place] at the Emergency Department for pain control.  A CT scan revealed a 4 mm stone in the distal left ureter.  She had no fevers, chills, nausea or vomiting.  She was admitted for pain control.  She has required narcotic pain medication since being admitted.      PAST MEDICAL HISTORY:  She has had prior stones that have required shock wave lithotripsy as well as stenting.      PAST SURGICAL HISTORY:  As above, also she has had an appendectomy and cholecystectomy.      HOME MEDICATIONS:  Imitrex.      ALLERGIES:  Ciprofloxacin, morphine, sulfa.      FAMILY HISTORY:  No family history of kidney stones or urologic malignancy.      SOCIAL HISTORY:  She is a current smoker.      REVIEW OF SYSTEMS:  Negative other than as mentioned in the HPI.  She has had abdominal pain only.  No fevers, chills, nausea or vomiting.      PHYSICAL EXAMINATION:   VITAL SIGNS:  She is currently afebrile and vital signs are stable.   GENERAL:  Currently, alert and oriented, in no acute distress.   HEENT:  Normocephalic, atraumatic.   RESPIRATORY:  Normal nonlabored breathing.   ABDOMEN:  Soft, nontender, nondistended.      IMAGING STUDIES:  I reviewed her CT scan images and there is a 4 mm stone in the distal left ureter near the UVJ.  No other stones identified.  There is left hydronephrosis and hydroureter down to the stone.      LABORATORY STUDIES:  Her urinalysis is nitrite positive.  Her white blood count is elevated at 15.5.      IMPRESSION AND PLAN:  A 41-year-old woman with a distal ureteral stone and potentially a urinary tract infection.  She appears nonseptic and  stable at this time.  I discussed her treatment options.  Given the fact that there is potential infection, I recommended treatment of the stone today.  She agrees to the plan.  I recommend that she undergo cystoscopy with left-sided ureteroscopy, holmium laser lithotripsy, stone basketing and stent placement in the operating room today.  However, if infected urine is discovered, she would simply undergo stent placement today with a delayed stone removal.  She understands the plan.  We will get her on the operating room schedule for today.  In the meantime, the nurses will strain her urine carefully to see if she is able to pass the stone.         LESVIA ZULUAGA MD             D: 2017 08:01   T: 2017 08:45   MT: RUDY#[id number]      Name:     ARI VIVEROS   MRN:      [id number]        Account:       [id number]   :      1975           Consult Date:  2017      Document: [id number]

## 2017-03-13 NOTE — PLAN OF CARE
Problem: Discharge Planning  Goal: Discharge Planning (Adult, OB, Behavioral, Peds)  Outcome: Improving  PRIMARY DIAGNOSIS: ACUTE RENAL COLIC  OUTPATIENT/OBSERVATION GOALS TO BE MET BEFORE DISCHARGE:     1. Pain status: Improved but still requiring IV narcotics.   2. Tolerating adequate PO diet: No  3. Cleared by consultants (if involved): Urology consult pending  4. ADLs back to baseline?  Yes  5. Activity and level of assistance: Ambulating independently.  6. Barriers to discharge noted No     A&O x 4.  currently denies pain and nausea. NPO at 0000 for urology consult.  SBA for safety. Replacing potassium per high K+ protocol, recheck at 0130 was 3.6. 20mEg given at 0300. Recheck scheduled for 0700.  IV fluids currently infusing. Will continue to monitor, assess, and offer supportive cares.

## 2017-03-13 NOTE — PLAN OF CARE
Problem: Discharge Planning  Goal: Discharge Planning (Adult, OB, Behavioral, Peds)  PRIMARY DIAGNOSIS: ACUTE RENAL COLIC  OUTPATIENT/OBSERVATION GOALS TO BE MET BEFORE DISCHARGE:      1. Pain status: Improved but still requiring IV narcotics.   2. Tolerating adequate PO diet: No  3. Cleared by consultants (if involved): Urology consult pending  4. ADLs back to baseline? Yes  5. Activity and level of assistance: Ambulating independently.  6. Barriers to discharge noted No      A&O x 4. Pt still requiring IV and PO pain meds. NPO currently. Urology has seen patient, surgery scheduled for 550pm and she should DC after. SBA for safety. IV fluids currently infusing. Will continue to monitor, assess, and offer supportive cares.

## 2017-03-13 NOTE — PLAN OF CARE
Problem: Discharge Planning  Goal: Discharge Planning (Adult, OB, Behavioral, Peds)  PRIMARY DIAGNOSIS: ACUTE RENAL COLIC  OUTPATIENT/OBSERVATION GOALS TO BE MET BEFORE DISCHARGE:     1. Pain status: Improved but still requiring IV narcotics.   2. Tolerating adequate PO diet: No  3. Cleared by consultants (if involved): Urology consult pending  4. ADLs back to baseline?  Yes  5. Activity and level of assistance: Ambulating independently.  6. Barriers to discharge noted No     A&O x 4.  Rates L abdominal/flank pain 7/10.  PRN dilaudid given.  Had emesis upon arrival to unit.  PRN zofran given. Continued to have emesis x 2.  PRN compazine given.  NPO at 0000 for urology consult.  Replacing potassium per protocol.

## 2017-03-13 NOTE — DISCHARGE SUMMARY
[facility] Outpatient / Observation Unit  Discharge Summary        Eugenie Toro MRN# [id number]   YOB: 1975 Age: 41 year old     Date of Admission:  3/12/2017  Date of Discharge:  3/13/2017  Admitting Physician:  Lucian Arnett MD  Discharge Physician: Nelida Cleary PA-C  Discharging Service: Hospitalist      Primary Provider: Julius, [facility]  Primary Care Physician Phone Number: [phone]         Primary Discharge Diagnoses:    Eugenie Toro is a 40 y/o female with PMG significant for urolithiasis, CKD, chronic pain, IBS, and migraines was admitted on 3/12/2017 for acute renal colic.     1. Acute renal colic: symptoms improved. Stone passed.  2. S/p cystoscopy, left retrograde pyelogram, left ureteroscopy with holmium laser lithotripsy and stone basketing, and stent placement   3. Equivocal UA: initial UA showed positive nitrites, negative leukocyte esterase, and only a few bacteria. Received a dose of IV Rocephin in the ED. Did no prescribe additional antibiotics, will contact patient if positive urine culture.   4. Hypokalemia: initially potassium of 3.1 and replaced per protocol with potassium of 3.5 on recheck.         Secondary Discharge Diagnoses:     Past Medical History   Diagnosis Date     Anxiety      Chronic kidney disease      Chronic pain      Irritable bowel      Migraines      PTSD (post-traumatic stress disorder)             Code Status:      Full Code        Brief Hospital Summary:       Reason for your hospital stay      You were admitted due to concerns for left flank pain due to a kidney stone found on a CT scan. You were treated supportively overnight with IV fluids, antinausea medications. Flomax, and pain control. Urology was consulted and recommended having a procedure performed to assist with you passing the stone. You tolerated the procedure well and are safe to discharge from recovery.        Please refer to initial admission history  and physical for further details.   Briefly, Eugenie Toro was admitted on 3/12/2017 with acute renal colic. Initial work up in the ED did not reveal evidence of grossly infected stone or significant renal failure/ATN to require inpatient hospitalization. Pt was registered to the Observation Unit for pain control and supportive measures.     Pt was resuscitated with IVF, and anti-emetics. Urine was strained and stone likely not passed. Urology consult was obtained and patient was taken for a procedure to have her stone removed and stent placed. Pain control was transitioned from IV to PO form. Labs reviewed and significant results addressed. On the day of discharge, pt's pain was controlled and tolerated her procedure well thus being able to discharge from recovery. Medications were reviewed and adjustments made as necessary. Pt is instructed to follow up as below.           Significant Lab During Hospitalization:        Recent Labs  Lab 03/12/17  1614   WBC 15.5*   HGB 13.6   HCT 38.1   MCV 88          Recent Labs  Lab 03/13/17  0700 03/13/17  0130 03/12/17  1614   NA  --   --  142   POTASSIUM 3.5 3.6 3.1*   CHLORIDE  --   --  107   CO2  --   --  26   ANIONGAP  --   --  9   GLC  --   --  96   BUN  --   --  10   CR  --   --  0.85   GFRESTIMATED  --   --  73   GFRESTBLACK  --   --  88   ALEXIS  --   --  8.7              Significant Imaging During Hospitalization:      Results for orders placed or performed during the hospital encounter of 03/12/17   CT Abdomen Pelvis w/o Contrast    Narrative    CT ABDOMEN AND PELVIS WITHOUT CONTRAST   3/12/2017 4:58 PM     HISTORY: Abdominal pain.    TECHNIQUE: Noncontrast CT abdomen and pelvis was performed. Radiation  dose for this scan was reduced using automated exposure control,  adjustment of the mA and/or kV according to patient size, or iterative  reconstruction technique.    COMPARISON: None.    FINDINGS: There is a 0.4 cm distal left ureter stone in the  region of  the left ureterovesical junction series 2 image 81. Associated mild to  moderate left hydronephrosis. No right ureter stone or hydronephrosis.  No detected additional intrarenal stones. Cholecystectomy. Liver,  adrenals, spleen, pancreas, and kidneys show no acute abnormalities.  No acute bowel finding. Appendix not seen. No secondary signs for  appendicitis.      Impression    IMPRESSION:  1. Distal left ureter stone is 0.4 cm. This is in the region of the  ureterovesical junction. Mild to moderate left hydronephrosis.  2. No other acute abnormality.    JOSELITO DEL ROSARIO MD   XR Surgery JAYA L/T 5 Min Fluoro w Stills    Narrative    This exam was marked as non-reportable because it will not be read by a   radiologist or a [facility] non-radiologist provider.                        Pending Results:        Unresulted Labs Ordered in the Past 30 Days of this Admission     Date and Time Order Name Status Description    3/12/2017 1631 Urine Culture Aerobic Bacterial Preliminary             Consultations This Hospital Stay:      Consultation during this admission received from urology         Discharge Instructions and Follow-Up:      Follow-up Appointments    Follow up with primary care provider, [facility], within 7 days for hospital follow- up.  No follow up labs or test are needed.  Follow up with Urology per their recommendations.            Discharge Disposition:      Discharged to home         Discharge Medications:        Current Discharge Medication List      START taking these medications    Details   oxyCODONE-acetaminophen (PERCOCET) 5-325 MG per tablet Take 1-2 tablets by mouth every 4 hours as needed for moderate to severe pain  Qty: 15 tablet, Refills: 0      tamsulosin (FLOMAX) 0.4 MG capsule Take 1 capsule (0.4 mg) by mouth daily  Qty: 7 capsule, Refills: 1      ondansetron (ZOFRAN ODT) 4 MG ODT tab Take 1 tablet (4 mg) by mouth every 8 hours as needed for nausea  Qty: 10 tablet,  "Refills: 0         CONTINUE these medications which have NOT CHANGED    Details   SUMAtriptan (IMITREX) 6 MG/0.5ML SOLN Inject 6 mg Subcutaneous every 2 hours as needed (migraine. ) Max dose is 12mg/24hrs               Allergies:         Allergies   Allergen Reactions     Ciprofloxacin Hives     Morphine Sulfate      Sulfa Drugs Hives         Condition and Physical on Discharge:      Discharge condition: Stable   Vitals: Blood pressure 136/72, pulse 87, temperature 98.3  F (36.8  C), temperature source Oral, resp. rate 18, height 1.651 m (5' 5\"), weight 127 kg (280 lb), SpO2 92 %, not currently breastfeeding.  280 lbs 0 oz      GENERAL:  Comfortable.  PSYCH: pleasant, oriented, no acute distress.  HEENT:  PERRLA. Normal conjunctiva, normal hearing, nasal mucosa and oropharynx are normal.  NECK:  Supple, no neck vein distention, adenopathy or bruits, normal thyroid.  HEART:  Normal S1, S2 with no murmur, no pericardial rub, gallops or S3 or S4.  LUNGS:  Clear to auscultation, normal respiratory effort. No wheezing, rales or ronchi.  ABDOMEN:  Soft, no hepatosplenomegaly, normal bowel sounds. Mild tenderness of LLQ and left flank. Non distended.   EXTREMITIES:  No pedal edema, +2 radial pulses bilateral and equal.  SKIN:  Dry to touch, no rash, wound or ulcerations.  NEUROLOGIC:  CN 2-12 intact, BL 5/5 symmetric upper and lower extremity strength, sensation is intact with no focal deficits.     Nelida Cleary PA-C  "

## 2017-03-13 NOTE — PLAN OF CARE
Problem: Discharge Planning  Goal: Discharge Planning (Adult, OB, Behavioral, Peds)  PRIMARY DIAGNOSIS: ACUTE RENAL COLIC  OUTPATIENT/OBSERVATION GOALS TO BE MET BEFORE DISCHARGE:      1. Pain status: Improved but still requiring IV narcotics.   2. Tolerating adequate PO diet: No  3. Cleared by consultants (if involved): Urology consult pending  4. ADLs back to baseline? Yes  5. Activity and level of assistance: Ambulating independently.  6. Barriers to discharge noted No      A&O x 4. Pain 5/10, IV Dilaudid given. NPO currently. Urology has seen patient, still waiting to hear plan. SBA for safety. IV fluids currently infusing. Will continue to monitor, assess, and offer supportive cares.

## 2017-03-13 NOTE — PHARMACY-ADMISSION MEDICATION HISTORY
Admission medication history interview status for this patient is complete. See [facility] admission navigator for allergy information, prior to admission medications and immunization status.     Medication history interview source(s):PatientPatient  Medication history resources (including written lists, pill bottles, clinic record):Epic    Changes made to PTA medication list:  Added: none  Deleted: none  Changed: Imitrex sq prn    Medication reconciliation/reorder completed by provider prior to medication history? No    For patients on insulin therapy: NO      Prior to Admission medications    Medication Sig Last Dose Taking? Auth Provider   SUMAtriptan (IMITREX) 6 MG/0.5ML SOLN Inject 6 mg Subcutaneous every 2 hours as needed (migraine. ) Max dose is 12mg/24hrs  Yes Reported, Patient

## 2017-03-14 LAB — COPATH REPORT: NORMAL

## 2017-03-14 NOTE — OR NURSING
Pt's flomax script was e-prescribed to scc after hours. Pt states she has more than enough flomax at home. Directed pt to take 1 capsule daily for 7 days as surgeon had prescribed. Pt verbalized understanding.

## 2017-03-14 NOTE — ANESTHESIA CARE TRANSFER NOTE
Patient: Eugenie Toro    Procedure(s):  CYSTOSCOPY, left  RETROGRADES pyelogram, left  URETEROSCOPY, LASER HOLMIUM LITHOTRIPSY URETER left, INSERT JJ STENT LEFT ureter  - Wound Class: II-Clean Contaminated    Diagnosis: Renal Colic  Diagnosis Additional Information: No value filed.    Anesthesia Type:   No value filed.     Note:  Airway :Face Mask  Patient transferred to:PACU  Comments: Report and signed off to RN in PACU.  Good Resps, skin pink, VSS, O2 via face tent.      Vitals: (Last set prior to Anesthesia Care Transfer)    CRNA VITALS  3/13/2017 1825 - 3/13/2017 1904      3/13/2017             Pulse: 125    SpO2: 98 %    Resp Rate (observed): (!)  5                Electronically Signed By: CAESAR Purvis CRNA  March 13, 2017  7:04 PM

## 2017-03-14 NOTE — ANESTHESIA POSTPROCEDURE EVALUATION
Patient: Eugenie Troo    Procedure(s):  CYSTOSCOPY, left  RETROGRADES pyelogram, left  URETEROSCOPY, LASER HOLMIUM LITHOTRIPSY URETER left, INSERT JJ STENT LEFT ureter  - Wound Class: II-Clean Contaminated    Diagnosis:Renal Colic  Diagnosis Additional Information: Renal Colic    Anesthesia Type:  General, LMA    Note:  Anesthesia Post Evaluation    Patient location during evaluation: PACU  Patient participation: Able to fully participate in evaluation  Level of consciousness: awake and alert  Pain management: adequate  Airway patency: patent  Cardiovascular status: acceptable  Respiratory status: acceptable  Hydration status: acceptable  PONV: controlled     Anesthetic complications: None          Last vitals:  Vitals:    03/13/17 1858 03/13/17 1905 03/13/17 1920   BP: (!) 138/95 (!) 135/93 (!) 129/100   Pulse:      Resp: 16 16 14   Temp: 97.3  F (36.3  C)     SpO2: 100% 100% 94%         Electronically Signed By: Surinder Gibson MD  March 13, 2017  7:25 PM

## 2017-03-14 NOTE — OR NURSING
Pt's bp elevated 160s/100s before leaving. Pt not having any pain and no symptoms from elevated pressures. [facility] consulted and ok'd to let pt d/c to home. Pt verbalized understanding to go in if having symptoms.

## 2017-03-14 NOTE — DISCHARGE INSTRUCTIONS
STENT INFORMATION/DISCHARGE INSTRUCTIONS  [facility]  [facility]  [phone]    During surgery, a stent may be placed in the ureter.  The ureter is the tube that drains urine from the kidney to the bladder.  The stent is placed to dilate (open) the ureter so stone fragments can pass easily through the ureter or to decrease ureteral swelling after surgery or to relieve an obstruction.      The stent is made of silicone.  The upper end of the stent curls in the kidney while the lower end rests in the bladder.    While the stent is in place you may experience the following symptoms:  Blood and/or small blood clots in the urine  Bladder spasms (frequency and urgency of urination)  Discomfort or aching in the back or side where the stent is  Burning or discomfort at the end of urine stream    To decrease these symptoms you should:  Take antispasmodic medication as prescribed (Detrol, Ditropan, etc.)  Drink plenty of fluids but avoid caffeine and citrus (include cranberry)  If you are having discomfort in back or side, decrease activity    Please call your physician or the physician on call if you experience:  Fever greater than 101 degrees  Severe pain not relieved by pain medication or rest    Please make an appointment for the removal of the stent according to your physician's instructions.      CYSTOSCOPY DISCHARGE INSTRUCTIONS  [facility]  [facility]  [phone]    YOU MAY GO BACK TO YOUR NORMAL DIET AND ACTIVITY, UNLESS YOUR DOCTOR TELLS YOU NOT TO.    FOR THE NEXT TWO DAYS, YOU MAY NOTICE:    SOME BLOOD IN YOUR URINE.  SOME BURNING WHEN YOU URINATE (USE THE TOILET).  AN URGE TO URINATE MORE OFTEN.  BLADDER SPASMS.    THESE ARE NORMAL AFTER THE PROCEDURE.  THEY SHOULD GO AWAY AFTER A DAY OR TWO.  TO RELIEVE THESE PROBLEMS:     DRINK 6 TO 8 LARGE GLASSES OF WATER EACH DAY (INCLUDES DRINKS AT MEALS).  THIS WILL HELP CLEAR THE URINE.    TAKE WARM  BATHS TO RELIEVE PAIN AND BLADDER SPASMS.  DO NOT ADD ANYTHING TO THE BATH WATER.    YOUR DOCTOR MAY PRESCRIBE PAIN MEDICINE.  YOU MAY ALSO TAKE TYLENOL (ACETAMINOPHEN) FOR PAIN.    CALL YOUR SURGEON IF YOU HAVE:    A FEVER OVER 101 DEGREES.  CHECK YOUR TEMPERATURE UNDER YOUR TONGUE.    CHILLS.    FAILURE TO URINATE (NO URINE COMES OUT WHEN YOU TRY TO USE THE TOILET).  TRY SOAKING IN A BATHTUB FULL OF WARM WATER.  IF STILL NO URINE, CALL YOUR DOCTOR.    A LOT OF BLOOD IN THE URINE, OR BLOOD CLOTS LARGER THAN A NICKEL.      PAIN IN THE BACK OR BELLY AREA (ABDOMEN).    PAIN OR SPASMS THAT ARE NOT RELIEVED BY WARM TUB BATHS AND PAIN MEDICINE.      SEVERE PAIN, BURNING OR OTHER PROBLEMS WHILE PASSING URINE.    PAIN THAT GETS WORSE AFTER TWO DAYS.        GENERAL ANESTHESIA OR SEDATION ADULT DISCHARGE INSTRUCTIONS   SPECIAL PRECAUTIONS FOR 24 HOURS AFTER SURGERY    IT IS NOT UNUSUAL TO FEEL LIGHT-HEADED OR FAINT, UP TO 24 HOURS AFTER SURGERY OR WHILE TAKING PAIN MEDICATION.  IF YOU HAVE THESE SYMPTOMS; SIT FOR A FEW MINUTES BEFORE STANDING AND HAVE SOMEONE ASSIST YOU WHEN YOU GET UP TO WALK OR USE THE BATHROOM.    YOU SHOULD REST AND RELAX FOR THE NEXT 24 HOURS AND YOU MUST MAKE ARRANGEMENTS TO HAVE SOMEONE STAY WITH YOU FOR AT LEAST 24 HOURS AFTER YOUR DISCHARGE.  AVOID HAZARDOUS AND STRENUOUS ACTIVITIES.  DO NOT MAKE IMPORTANT DECISIONS FOR 24 HOURS.    DO NOT DRIVE ANY VEHICLE OR OPERATE MECHANICAL EQUIPMENT FOR 24 HOURS FOLLOWING THE END OF YOUR SURGERY.  EVEN THOUGH YOU MAY FEEL NORMAL, YOUR REACTIONS MAY BE AFFECTED BY THE MEDICATION YOU HAVE RECEIVED.    DO NOT DRINK ALCOHOLIC BEVERAGES FOR 24 HOURS FOLLOWING YOUR SURGERY.    DRINK CLEAR LIQUIDS (APPLE JUICE, GINGER ALE, 7-UP, BROTH, ETC.).  PROGRESS TO YOUR REGULAR DIET AS YOU FEEL ABLE.    YOU MAY HAVE A DRY MOUTH, A SORE THROAT, MUSCLES ACHES OR TROUBLE SLEEPING.  THESE SHOULD GO AWAY AFTER 24 HOURS.    CALL YOUR DOCTOR FOR ANY OF THE FOLLOWING:  SIGNS OF  INFECTION (FEVER, GROWING TENDERNESS AT THE SURGERY SITE, A LARGE AMOUNT OF DRAINAGE OR BLEEDING, SEVERE PAIN, FOUL-SMELLING DRAINAGE, REDNESS OR SWELLING.    IT HAS BEEN OVER 8 TO 10 HOURS SINCE SURGERY AND YOU ARE STILL NOT ABLE TO URINATE (PASS WATER).

## 2017-03-14 NOTE — OP NOTE
DATE OF PROCEDURE:  03/13/2017       PREOPERATIVE DIAGNOSIS:  Left ureteral stone.      POSTOPERATIVE DIAGNOSIS:  Left ureteral stone.      PROCEDURE:  Cystoscopy, left retrograde pyelogram, left ureteroscopy with holmium laser lithotripsy and stone basketing, placement of 4.7 x 26 double-J left ureteral stent, interpretation of fluoroscopic images.      ANESTHESIA:  General.      COMPLICATIONS:  None.      INDICATIONS FOR PROCEDURE:  Eugenie Toor is a 41-year-old woman with a left ureteral stone who now presents for removal.      DETAILS OF THE PROCEDURE:  The risks and benefits of the procedure were explained in detail to patient and informed consent was obtained.  The patient was brought to the operating room and placed supine on the table, where she underwent general endotracheal anesthetic.  She was then moved down to the dorsal lithotomy position, where she was prepped and draped in standard sterile fashion.      The procedure began by introducing the 22 Georgian rigid cystoscope through the urethra into the bladder and performing cystoscopy.  There were no urothelial abnormalities identified.  The left ureter was identified and catheterized with ureteral catheter.  Retrograde pyelogram was taken.  This was found to have hydronephrosis and hydroureter down to the level of the stone.  I passed a Glidewire into the left kidney and to the left ureter and backloaded off the ureteral catheter along with the scope.  Alongside the Glidewire, I passed a rigid ureteroscope through the urethra into the bladder and up the left ureter.  The stone was seen in the distal left ureter.  I used the 365 micron holmium laser fiber to break up the stone into 4 fragments, and these fragments were basketed out and placed in the bladder individually.  I then performed ureteroscopy all the way up to the kidney, and no stones were identified.  I removed the ureteroscope.  I loaded a 4.7 x 26 double-J ureteral stent over the  wire.  The wire was pulled back and a good curl could be seen in the renal pelvis under fluoroscopy.  I reinserted the scope, and a good curl seen in the bladder under direct visualization.  I drained the ureteral stone fragments and removed the scope.  A B&O suppository was placed and the procedure was concluded.      The patient tolerated the procedure well without complications.  She went to postanesthetic care in good condition.  She will go home from there.  I will see her back next week for stent removal in the office.         LESVIA ZULUAGA MD             D: 2017 18:52   T: 2017 10:32   MT: [id number]#[id number]      Name:     ARI VIVEROS   MRN:      [id number]        Account:        [id number]   :      1975           Procedure Date: 2017      Document: [id number]

## 2017-03-14 NOTE — ANESTHESIA PREPROCEDURE EVALUATION
Anesthesia Evaluation     . Pt has had prior anesthetic. Type: General      ROS/MED HX    ENT/Pulmonary:  - neg pulmonary ROS     Neurologic:  - neg neurologic ROS     Cardiovascular:  - neg cardiovascular ROS       METS/Exercise Tolerance:     Hematologic:  - neg hematologic  ROS       Musculoskeletal:  - neg musculoskeletal ROS       GI/Hepatic:  - neg GI/hepatic ROS       Renal/Genitourinary:     (+) Nephrolithiasis ,       Endo:     (+) Obesity, .      Psychiatric:  - neg psychiatric ROS       Infectious Disease:  - neg infectious disease ROS       Malignancy:      - no malignancy   Other:    (+) No chance of pregnancy C-spine cleared: N/A, no H/O Chronic Pain,no other significant disability              Physical Exam  Normal systems: cardiovascular, pulmonary and dental    Airway   Mallampati: II  TM distance: >3 FB  Neck ROM: full    Dental     Cardiovascular       Pulmonary                     Anesthesia Plan      History & Physical Review  History and physical reviewed and following examination; no interval change.    ASA Status:  2 .    NPO Status:  > 8 hours    Plan for General and LMA with Intravenous induction. Maintenance will be Balanced.    PONV prophylaxis:  Ondansetron (or other 5HT-3) and Dexamethasone or Solumedrol       Postoperative Care  Postoperative pain management:  IV analgesics.      Consents  Anesthetic plan, risks, benefits and alternatives discussed with:  Patient.  Use of blood products discussed: Yes.   Consented to blood products.  .                          .

## 2017-03-16 LAB
APPEARANCE STONE: NORMAL
COMPN STONE: NORMAL
NUMBER STONE: 3
SIZE STONE: NORMAL MM3
WT STONE: 32 MG

## 2017-03-17 ENCOUNTER — TELEPHONE (OUTPATIENT)
Dept: UROLOGY | Facility: CLINIC | Age: 42
End: 2017-03-17

## 2017-03-17 DIAGNOSIS — N32.89 BLADDER SPASMS: Primary | ICD-10-CM

## 2017-03-17 RX ORDER — OXYBUTYNIN CHLORIDE 10 MG/1
10 TABLET, EXTENDED RELEASE ORAL DAILY
Qty: 30 TABLET | Refills: 0 | Status: SHIPPED | OUTPATIENT
Start: 2017-03-17

## 2017-03-17 NOTE — TELEPHONE ENCOUNTER
Patient called c/o stent discomfort/cramping sensation and was also concerned about blood in her urine. I assured her the blood-tinged urine is normal and she should drink some water to clear it. Per protocol Oxybutinen was sent into patient's requested pharmacy to help with bladder spasms. Patient aware that if taken with Flomax it may cause light-headedness. She will call if she develops any worsening symptoms or if no changes with new medication. Jennifer Roth LPN

## 2017-03-21 ENCOUNTER — OFFICE VISIT (OUTPATIENT)
Dept: UROLOGY | Facility: CLINIC | Age: 42
End: 2017-03-21
Payer: COMMERCIAL

## 2017-03-21 VITALS — WEIGHT: 280 LBS | BODY MASS INDEX: 46.65 KG/M2 | HEIGHT: 65 IN

## 2017-03-21 DIAGNOSIS — N20.1 CALCULUS OF URETER: Primary | ICD-10-CM

## 2017-03-21 PROCEDURE — 99212 OFFICE O/P EST SF 10 MIN: CPT | Mod: 25 | Performed by: UROLOGY

## 2017-03-21 PROCEDURE — 52310 CYSTOSCOPY AND TREATMENT: CPT | Performed by: UROLOGY

## 2017-03-21 RX ORDER — NITROFURANTOIN 25; 75 MG/1; MG/1
100 CAPSULE ORAL ONCE
Qty: 1 CAPSULE | Refills: 0 | Status: SHIPPED | OUTPATIENT
Start: 2017-03-21 | End: 2017-03-21

## 2017-03-21 RX ORDER — IBUPROFEN 600 MG/1
600 TABLET, FILM COATED ORAL
COMMUNITY
Start: 2016-12-07

## 2017-03-21 ASSESSMENT — PAIN SCALES - GENERAL: PAINLEVEL: NO PAIN (0)

## 2017-03-21 NOTE — LETTER
3/21/2017       RE: Eugenie Toro  [address]  [place] [address]     Dear Colleague,    Thank you for referring your patient, Eugenie Toro, to the [facility] [place] at [facility]. Please see a copy of my visit note below.    Office Visit Note  [facility]  [phone]    Mar 21, 2017    [unfilled]    1975    UROLOGIC DIAGNOSES:    Ureteral stone    CURRENT INTERVENTIONS:    S/P extraction    History:    Eugenie returns to clinic today for postoperative stent removal. The stent has been bothersome to her but overall she has felt well since the procedure. Stone was composed of calcium oxalate monohydrate.      Imaging:      Labs:      MEDICATIONS:    Current Outpatient Prescriptions:      ibuprofen (ADVIL/MOTRIN) 600 MG tablet, Take 600 mg by mouth, Disp: , Rfl:      nitrofurantoin, macrocrystal-monohydrate, (MACROBID) 100 MG capsule, Take 1 capsule (100 mg) by mouth once for 1 dose, Disp: 1 capsule, Rfl: 0     oxybutynin (DITROPAN XL) 10 MG 24 hr tablet, Take 1 tablet (10 mg) by mouth daily, Disp: 30 tablet, Rfl: 0     HYDROcodone-acetaminophen (NORCO) 5-325 MG per tablet, Take 1-2 tablets by mouth every 4 hours as needed for moderate to severe pain (Moderate to Severe Pain), Disp: 5 tablet, Rfl: 0     tamsulosin (FLOMAX) 0.4 MG capsule, Take 1 capsule (0.4 mg) by mouth daily, Disp: 8 capsule, Rfl: 1     oxyCODONE-acetaminophen (PERCOCET) 5-325 MG per tablet, Take 1-2 tablets by mouth every 4 hours as needed for moderate to severe pain, Disp: 15 tablet, Rfl: 0     tamsulosin (FLOMAX) 0.4 MG capsule, Take 1 capsule (0.4 mg) by mouth daily, Disp: 7 capsule, Rfl: 1     SUMAtriptan (IMITREX) 6 MG/0.5ML SOLN, Inject 6 mg Subcutaneous every 2 hours as needed (migraine. ) Max dose is 12mg/24hrs, Disp: , Rfl:     ALLERGIES:     Allergies   Allergen Reactions     Ciprofloxacin Hives     Morphine  "Sulfate      Sulfa Drugs Hives       REVIEW OF SYSTEMS: Ten point review of systems without change as outlined in HPI    SURGICAL HISTORY:    Past Surgical History   Procedure Laterality Date     Hysterectomy       Tonsillectomy & adenoidectomy       Cl aff surgical pathology       Appendectomy       Cholecystectomy       Laparoscopy       Cl aff surgical pathology       C removal of kidney stone       Discectomy lumbar posterior microscopic one level  9/3/2013     Procedure: DISCECTOMY LUMBAR POSTERIOR MICROSCOPIC ONE LEVEL;  Left Lumbar 5-Sacral 1 Microdiscectomy ;  Surgeon: Karl Anglin MD;  Location: [facility] OR     Orthopedic surgery       Combined cystoscopy, retrogrades, ureteroscopy, laser holmium lithotripsy ureter(s), insert stent Left 3/13/2017     Procedure: COMBINED CYSTOSCOPY, RETROGRADES, URETEROSCOPY, LASER HOLMIUM LITHOTRIPSY URETER(S), INSERT STENT;  Surgeon: Greg Luque MD;  Location:  OR         PHYSICAL EXAM:    Ht 1.651 m (5' 5\")  Wt 127 kg (280 lb)  BMI 46.59 kg/m2    HEENT: Normocephalic and atraumatic    Cardiac: Not done    Back/Flank: Not done    CNS/PNS: Alert and oriented    Respiratory: Normal nonlabored breathing    Abdomen: Soft nontender and nondistended    Peripheral Vascular: No peripheral edema    Mental Status: Normal    External Genitalia: Not done    Bladder: Not done    Urethra: Not done     Vagina: Not done    Cystoscopy:  I performed flexible cystoscopy today and the stent was removed without difficulty      Urinalysis:  UA RESULTS:  Recent Labs   Lab Test  03/12/17   1624   COLOR  Radha   APPEARANCE  Clear   URINEGLC  Negative   URINEBILI  Negative   URINEKETONE  5*   SG  1.017   UBLD  Small*   URINEPH  6.0   PROTEIN  Negative   NITRITE  Positive*   LEUKEST  Negative   RBCU  17*   WBCU  2         IMPRESSION:    Doing well, stent removed    PLAN:    We discussed prevention methods for future stones. She will follow-up with me as needed in the " future.    Total Time:  15 minutes  Time in Consultation: 10 minutes      Greg Luque M.D.

## 2017-03-21 NOTE — MR AVS SNAPSHOT
"              After Visit Summary   3/21/2017    Eugenie Toro    MRN: [id number]           Patient Information     Date Of Birth          1975        Visit Information        Provider Department      3/21/2017 2:00 PM Greg Luque MD; [facility] [place]        Today's Diagnoses     Calculus of ureter    -  1      Care Instructions         AFTER YOUR CYSTOSCOPY         You have just completed a cystoscopy, or \"cysto\", which allowed your physician to learn more about your bladder (or to remove a stent placed after surgery). We suggest that you continue to avoid caffeine, fruit juice, and alcohol for the next 24 hours, however, you are encouraged to return to your normal activities.       A few things that are considered normal after your cystoscopy:    * small amount of bleeding (or spotting) that clears within the next 24 hours    * slight burning sensation with urination    * sensation to of needing to avoid more frequently    * the feeling of \"air\" in your urine    * mild discomfort that is relieved with Tylonol        Please contact our office promptly if you:    * develop a fever above 101 degrees    * are unable to urinate    * develop bright red blood that does not stop    * severe pain or swelling        And of course, please contact our office with any concerns or questions [phone]            Follow-ups after your visit        Follow-up notes from your care team     Return if symptoms worsen or fail to improve.      Who to contact     If you have questions or need follow up information about today's clinic visit or your schedule please contact [facility] [place] directly at [phone].  Normal or non-critical lab and imaging results will be communicated to you by MyChart, letter or phone within 4 business days after the clinic has received the results. If you do not hear from us within 7 days, please " "contact the clinic through [employer] or phone. If you have a critical or abnormal lab result, we will notify you by phone as soon as possible.  Submit refill requests through [employer] or call your pharmacy and they will forward the refill request to us. Please allow 3 business days for your refill to be completed.          Additional Information About Your Visit        [facility] Information     [employer] lets you send messages to your doctor, view your test results, renew your prescriptions, schedule appointments and more. To sign up, go to [email or web address] . Click on \"Log in\" on the left side of the screen, which will take you to the Welcome page. Then click on \"Sign up Now\" on the right side of the page.     You will be asked to enter the access code listed below, as well as some personal information. Please follow the directions to create your username and password.     Your access code is: [email or web address]  Expires: 6/10/2017  5:59 PM     Your access code will  in 90 days. If you need help or a new code, please call your [place] clinic or [phone].        Care EveryWhere ID     This is your Care EveryWhere ID. This could be used by other organizations to access your [facility] medical records  [id number]        Your Vitals Were     Height BMI (Body Mass Index)                1.651 m (5' 5\") 46.59 kg/m2           Blood Pressure from Last 3 Encounters:   17 (!) 160/103   13 100/66    Weight from Last 3 Encounters:   17 127 kg (280 lb)   17 127 kg (280 lb)   13 94.3 kg (207 lb 14.3 oz)              Today, you had the following     No orders found for display         Today's Medication Changes          These changes are accurate as of: 3/21/17  2:29 PM.  If you have any questions, ask your nurse or doctor.               Start taking these medicines.        Dose/Directions    nitrofurantoin (macrocrystal-monohydrate) 100 MG capsule   Commonly known as:  MACROBID   Used for:  " Calculus of ureter        Dose:  100 mg   Take 1 capsule (100 mg) by mouth once for 1 dose   Quantity:  1 capsule   Refills:  0            Where to get your medicines      These medications were sent to [place] [address] [place] [employer] [facility] [address] [facility] [address]     Phone:  [phone]     nitrofurantoin (macrocrystal-monohydrate) 100 MG capsule                Primary Care Provider Office Phone # Fax #    [facility] [phone] [address] [place] [address]        Thank you!     Thank you for choosing [facility] [place]  for your care. Our goal is always to provide you with excellent care. Hearing back from our patients is one way we can continue to improve our services. Please take a few minutes to complete the written survey that you may receive in the mail after your visit with us. Thank you!             Your Updated Medication List - Protect others around you: Learn how to safely use, store and throw away your medicines at www.disposemymeds.org.          This list is accurate as of: 3/21/17  2:29 PM.  Always use your most recent med list.                   Brand Name Dispense Instructions for use    HYDROcodone-acetaminophen 5-325 MG per tablet    NORCO    5 tablet    Take 1-2 tablets by mouth every 4 hours as needed for moderate to severe pain (Moderate to Severe Pain)       ibuprofen 600 MG tablet    ADVIL/MOTRIN     Take 600 mg by mouth       nitrofurantoin (macrocrystal-monohydrate) 100 MG capsule    MACROBID    1 capsule    Take 1 capsule (100 mg) by mouth once for 1 dose       oxybutynin 10 MG 24 hr tablet    DITROPAN XL    30 tablet    Take 1 tablet (10 mg) by mouth daily       oxyCODONE-acetaminophen 5-325 MG per tablet    PERCOCET    15 tablet    Take 1-2 tablets by mouth every 4 hours as needed for moderate to severe pain       SUMAtriptan 6 MG/0.5ML injection    IMITREX     Inject 6  mg Subcutaneous every 2 hours as needed (migraine.?) Max dose is 12mg/24hrs       * tamsulosin 0.4 MG capsule    FLOMAX    7 capsule    Take 1 capsule (0.4 mg) by mouth daily       * tamsulosin 0.4 MG capsule    FLOMAX    8 capsule    Take 1 capsule (0.4 mg) by mouth daily       * Notice:  This list has 2 medication(s) that are the same as other medications prescribed for you. Read the directions carefully, and ask your doctor or other care provider to review them with you.

## 2017-03-21 NOTE — NURSING NOTE
Chief Complaint   Patient presents with     Cystoscopy     Stent Removal      Prior to the start of the procedure and with procedural staff participation, I verbally confirmed the patient s identity using two indicators, relevant allergies, that the procedure was appropriate and matched the consent or emergent situation, and that the correct equipment/implants were available. Immediately prior to starting the procedure I conducted the Time Out with the procedural staff and re-confirmed the patient s name, procedure, and site/side. (The Joint Commission universal protocol was followed.)  Yes    Sedation (Moderate or Deep): None  Jennifer Roth LPN

## 2017-03-21 NOTE — PATIENT INSTRUCTIONS
"     AFTER YOUR CYSTOSCOPY         You have just completed a cystoscopy, or \"cysto\", which allowed your physician to learn more about your bladder (or to remove a stent placed after surgery). We suggest that you continue to avoid caffeine, fruit juice, and alcohol for the next 24 hours, however, you are encouraged to return to your normal activities.       A few things that are considered normal after your cystoscopy:    * small amount of bleeding (or spotting) that clears within the next 24 hours    * slight burning sensation with urination    * sensation to of needing to avoid more frequently    * the feeling of \"air\" in your urine    * mild discomfort that is relieved with Tylonol        Please contact our office promptly if you:    * develop a fever above 101 degrees    * are unable to urinate    * develop bright red blood that does not stop    * severe pain or swelling        And of course, please contact our office with any concerns or questions [phone]      "

## 2021-05-26 ENCOUNTER — RECORDS - HEALTHEAST (OUTPATIENT)
Dept: ADMINISTRATIVE | Facility: CLINIC | Age: 46
End: 2021-05-26

## (undated) DEVICE — GOWN IMPERVIOUS SPECIALTY XLG/XLONG 32474

## (undated) DEVICE — PREP SCRUB CARE CHLOROXYLENOL (PCMX) 4OZ 29902-004

## (undated) DEVICE — CATH URETERAL FLEX TIP TIGERTAIL 06FRX70CM 139006

## (undated) DEVICE — BASKET RETRIEVAL 1.9FRX120CM ESCAPE NTNL 4 WIRE 390-201

## (undated) DEVICE — PACK CYSTO CUSTOM RIDGES

## (undated) DEVICE — COVER FOOTSWITCH W/CINCH 20X24" 923267

## (undated) DEVICE — LINEN HALF SHEET 5512

## (undated) DEVICE — GLOVE PROTEXIS POWDER FREE SMT 7.5  2D72PT75X

## (undated) DEVICE — SOL WATER IRRIG 1000ML BOTTLE 2F7114

## (undated) DEVICE — GLOVE PROTEXIS POWDER FREE 7.5 ORTHOPEDIC 2D73ET75

## (undated) DEVICE — RAD RX ISOVUE 300 (50ML) 61% IOPAMIDOL CHARGE PER ML

## (undated) DEVICE — TUBING FLUID WARMER RANGER 24750

## (undated) DEVICE — LASER FIBER HOLMIUM 365UM HTB-365

## (undated) DEVICE — BAG CLEAR TRASH 1.3M 39X33" P4040C

## (undated) DEVICE — SOL WATER IRRIG 3000ML BAG 2B7117

## (undated) DEVICE — GUIDEWIRE URO STR STIFF .035"X150CM NITINOL 150NSS35

## (undated) DEVICE — LINEN FULL SHEET 5511

## (undated) RX ORDER — LIDOCAINE HYDROCHLORIDE 10 MG/ML
INJECTION, SOLUTION EPIDURAL; INFILTRATION; INTRACAUDAL; PERINEURAL
Status: DISPENSED
Start: 2017-03-13

## (undated) RX ORDER — FENTANYL CITRATE 50 UG/ML
INJECTION, SOLUTION INTRAMUSCULAR; INTRAVENOUS
Status: DISPENSED
Start: 2017-03-13

## (undated) RX ORDER — CEFAZOLIN SODIUM 1 G/50ML
SOLUTION INTRAVENOUS
Status: DISPENSED
Start: 2017-03-13

## (undated) RX ORDER — ONDANSETRON 2 MG/ML
INJECTION INTRAMUSCULAR; INTRAVENOUS
Status: DISPENSED
Start: 2017-03-13

## (undated) RX ORDER — GLYCOPYRROLATE 0.2 MG/ML
INJECTION INTRAMUSCULAR; INTRAVENOUS
Status: DISPENSED
Start: 2017-03-13

## (undated) RX ORDER — PROPOFOL 10 MG/ML
INJECTION, EMULSION INTRAVENOUS
Status: DISPENSED
Start: 2017-03-13